# Patient Record
Sex: FEMALE | Race: WHITE | Employment: OTHER | URBAN - METROPOLITAN AREA
[De-identification: names, ages, dates, MRNs, and addresses within clinical notes are randomized per-mention and may not be internally consistent; named-entity substitution may affect disease eponyms.]

---

## 2017-12-15 PROBLEM — I50.9 ACUTE ON CHRONIC CONGESTIVE HEART FAILURE (HCC): Status: ACTIVE | Noted: 2017-12-15

## 2018-04-03 RX ORDER — CARVEDILOL 12.5 MG/1
12.5 TABLET ORAL 2 TIMES DAILY WITH MEALS
Status: ON HOLD | COMMUNITY
End: 2018-07-28 | Stop reason: HOSPADM

## 2018-04-05 ENCOUNTER — ANESTHESIA (OUTPATIENT)
Dept: OPERATING ROOM | Age: 78
End: 2018-04-05
Payer: MEDICARE

## 2018-04-05 ENCOUNTER — HOSPITAL ENCOUNTER (OUTPATIENT)
Dept: GENERAL RADIOLOGY | Age: 78
Discharge: HOME | End: 2018-04-07
Attending: ORTHOPAEDIC SURGERY
Payer: MEDICARE

## 2018-04-05 ENCOUNTER — HOSPITAL ENCOUNTER (OUTPATIENT)
Age: 78
Setting detail: OUTPATIENT SURGERY
Discharge: HOME | End: 2018-04-05
Attending: ORTHOPAEDIC SURGERY | Admitting: ORTHOPAEDIC SURGERY
Payer: MEDICARE

## 2018-04-05 ENCOUNTER — ANESTHESIA EVENT (OUTPATIENT)
Dept: OPERATING ROOM | Age: 78
End: 2018-04-05
Payer: MEDICARE

## 2018-04-05 VITALS
SYSTOLIC BLOOD PRESSURE: 134 MMHG | TEMPERATURE: 95 F | RESPIRATION RATE: 18 BRPM | HEART RATE: 74 BPM | DIASTOLIC BLOOD PRESSURE: 66 MMHG | HEIGHT: 67 IN | WEIGHT: 142 LBS | OXYGEN SATURATION: 100 % | BODY MASS INDEX: 22.29 KG/M2

## 2018-04-05 VITALS — OXYGEN SATURATION: 100 % | TEMPERATURE: 95.7 F | SYSTOLIC BLOOD PRESSURE: 95 MMHG | DIASTOLIC BLOOD PRESSURE: 46 MMHG

## 2018-04-05 DIAGNOSIS — R52 PAIN: ICD-10-CM

## 2018-04-05 DIAGNOSIS — S52.022A: Primary | ICD-10-CM

## 2018-04-05 LAB
ANION GAP SERPL CALCULATED.3IONS-SCNC: 13 MMOL/L (ref 7–16)
BUN BLDV-MCNC: 54 MG/DL (ref 8–23)
CALCIUM SERPL-MCNC: 9 MG/DL (ref 8.6–10.2)
CHLORIDE BLD-SCNC: 99 MMOL/L (ref 98–107)
CO2: 26 MMOL/L (ref 22–29)
CREAT SERPL-MCNC: 1.3 MG/DL (ref 0.5–1)
DIGOXIN LEVEL: 0.6 NG/ML (ref 0.8–2)
GFR AFRICAN AMERICAN: 48
GFR NON-AFRICAN AMERICAN: 40 ML/MIN/1.73
GLUCOSE BLD-MCNC: 73 MG/DL (ref 74–109)
HCT VFR BLD CALC: 34.4 % (ref 34–48)
HEMOGLOBIN: 11.1 G/DL (ref 11.5–15.5)
MCH RBC QN AUTO: 34 PG (ref 26–35)
MCHC RBC AUTO-ENTMCNC: 32.3 % (ref 32–34.5)
MCV RBC AUTO: 105.5 FL (ref 80–99.9)
PDW BLD-RTO: 17.1 FL (ref 11.5–15)
PLATELET # BLD: 234 E9/L (ref 130–450)
PMV BLD AUTO: 9.2 FL (ref 7–12)
POTASSIUM REFLEX MAGNESIUM: 3.7 MMOL/L (ref 3.5–5)
RBC # BLD: 3.26 E12/L (ref 3.5–5.5)
SODIUM BLD-SCNC: 138 MMOL/L (ref 132–146)
WBC # BLD: 5.9 E9/L (ref 4.5–11.5)

## 2018-04-05 PROCEDURE — 7100000010 HC PHASE II RECOVERY - FIRST 15 MIN: Performed by: ORTHOPAEDIC SURGERY

## 2018-04-05 PROCEDURE — 6360000002 HC RX W HCPCS: Performed by: NURSE ANESTHETIST, CERTIFIED REGISTERED

## 2018-04-05 PROCEDURE — 2580000003 HC RX 258: Performed by: ORTHOPAEDIC SURGERY

## 2018-04-05 PROCEDURE — 2500000003 HC RX 250 WO HCPCS: Performed by: NURSE ANESTHETIST, CERTIFIED REGISTERED

## 2018-04-05 PROCEDURE — 7100000011 HC PHASE II RECOVERY - ADDTL 15 MIN: Performed by: ORTHOPAEDIC SURGERY

## 2018-04-05 PROCEDURE — 2500000003 HC RX 250 WO HCPCS: Performed by: ORTHOPAEDIC SURGERY

## 2018-04-05 PROCEDURE — 3600000012 HC SURGERY LEVEL 2 ADDTL 15MIN: Performed by: ORTHOPAEDIC SURGERY

## 2018-04-05 PROCEDURE — 85027 COMPLETE CBC AUTOMATED: CPT

## 2018-04-05 PROCEDURE — 7100000000 HC PACU RECOVERY - FIRST 15 MIN: Performed by: ORTHOPAEDIC SURGERY

## 2018-04-05 PROCEDURE — 76000 FLUOROSCOPY <1 HR PHYS/QHP: CPT

## 2018-04-05 PROCEDURE — 6360000002 HC RX W HCPCS: Performed by: ORTHOPAEDIC SURGERY

## 2018-04-05 PROCEDURE — 2580000003 HC RX 258: Performed by: NURSE ANESTHETIST, CERTIFIED REGISTERED

## 2018-04-05 PROCEDURE — 7100000001 HC PACU RECOVERY - ADDTL 15 MIN: Performed by: ORTHOPAEDIC SURGERY

## 2018-04-05 PROCEDURE — 36415 COLL VENOUS BLD VENIPUNCTURE: CPT

## 2018-04-05 PROCEDURE — 80048 BASIC METABOLIC PNL TOTAL CA: CPT

## 2018-04-05 PROCEDURE — 3700000000 HC ANESTHESIA ATTENDED CARE: Performed by: ORTHOPAEDIC SURGERY

## 2018-04-05 PROCEDURE — 80162 ASSAY OF DIGOXIN TOTAL: CPT

## 2018-04-05 PROCEDURE — 3700000001 HC ADD 15 MINUTES (ANESTHESIA): Performed by: ORTHOPAEDIC SURGERY

## 2018-04-05 PROCEDURE — 3600000002 HC SURGERY LEVEL 2 BASE: Performed by: ORTHOPAEDIC SURGERY

## 2018-04-05 RX ORDER — PROPOFOL 10 MG/ML
INJECTION, EMULSION INTRAVENOUS PRN
Status: DISCONTINUED | OUTPATIENT
Start: 2018-04-05 | End: 2018-04-05 | Stop reason: SDUPTHER

## 2018-04-05 RX ORDER — GLYCOPYRROLATE 0.2 MG/ML
INJECTION INTRAMUSCULAR; INTRAVENOUS PRN
Status: DISCONTINUED | OUTPATIENT
Start: 2018-04-05 | End: 2018-04-05 | Stop reason: SDUPTHER

## 2018-04-05 RX ORDER — SODIUM CHLORIDE 0.9 % (FLUSH) 0.9 %
10 SYRINGE (ML) INJECTION EVERY 12 HOURS SCHEDULED
Status: DISCONTINUED | OUTPATIENT
Start: 2018-04-05 | End: 2018-04-05 | Stop reason: HOSPADM

## 2018-04-05 RX ORDER — ROCURONIUM BROMIDE 10 MG/ML
INJECTION, SOLUTION INTRAVENOUS PRN
Status: DISCONTINUED | OUTPATIENT
Start: 2018-04-05 | End: 2018-04-05 | Stop reason: SDUPTHER

## 2018-04-05 RX ORDER — FENTANYL CITRATE 50 UG/ML
INJECTION, SOLUTION INTRAMUSCULAR; INTRAVENOUS PRN
Status: DISCONTINUED | OUTPATIENT
Start: 2018-04-05 | End: 2018-04-05 | Stop reason: SDUPTHER

## 2018-04-05 RX ORDER — HYDROCODONE BITARTRATE AND ACETAMINOPHEN 5; 325 MG/1; MG/1
2 TABLET ORAL EVERY 4 HOURS PRN
Status: DISCONTINUED | OUTPATIENT
Start: 2018-04-05 | End: 2018-04-05 | Stop reason: HOSPADM

## 2018-04-05 RX ORDER — DEXAMETHASONE SODIUM PHOSPHATE 4 MG/ML
INJECTION, SOLUTION INTRA-ARTICULAR; INTRALESIONAL; INTRAMUSCULAR; INTRAVENOUS; SOFT TISSUE PRN
Status: DISCONTINUED | OUTPATIENT
Start: 2018-04-05 | End: 2018-04-05 | Stop reason: SDUPTHER

## 2018-04-05 RX ORDER — FENTANYL CITRATE 50 UG/ML
25 INJECTION, SOLUTION INTRAMUSCULAR; INTRAVENOUS EVERY 5 MIN PRN
Status: DISCONTINUED | OUTPATIENT
Start: 2018-04-05 | End: 2018-04-05 | Stop reason: HOSPADM

## 2018-04-05 RX ORDER — MORPHINE SULFATE 2 MG/ML
2 INJECTION, SOLUTION INTRAMUSCULAR; INTRAVENOUS EVERY 5 MIN PRN
Status: DISCONTINUED | OUTPATIENT
Start: 2018-04-05 | End: 2018-04-05 | Stop reason: HOSPADM

## 2018-04-05 RX ORDER — SODIUM CHLORIDE, SODIUM LACTATE, POTASSIUM CHLORIDE, CALCIUM CHLORIDE 600; 310; 30; 20 MG/100ML; MG/100ML; MG/100ML; MG/100ML
INJECTION, SOLUTION INTRAVENOUS CONTINUOUS PRN
Status: DISCONTINUED | OUTPATIENT
Start: 2018-04-05 | End: 2018-04-05 | Stop reason: SDUPTHER

## 2018-04-05 RX ORDER — HYDROMORPHONE HYDROCHLORIDE 2 MG/1
2 TABLET ORAL
Status: DISCONTINUED | OUTPATIENT
Start: 2018-04-05 | End: 2018-04-05 | Stop reason: HOSPADM

## 2018-04-05 RX ORDER — BUPIVACAINE HYDROCHLORIDE AND EPINEPHRINE 5; 5 MG/ML; UG/ML
INJECTION, SOLUTION EPIDURAL; INTRACAUDAL; PERINEURAL PRN
Status: DISCONTINUED | OUTPATIENT
Start: 2018-04-05 | End: 2018-04-05 | Stop reason: HOSPADM

## 2018-04-05 RX ORDER — HYDROMORPHONE HYDROCHLORIDE 2 MG/1
1 TABLET ORAL
Status: DISCONTINUED | OUTPATIENT
Start: 2018-04-05 | End: 2018-04-05 | Stop reason: HOSPADM

## 2018-04-05 RX ORDER — SODIUM CHLORIDE 0.9 % (FLUSH) 0.9 %
10 SYRINGE (ML) INJECTION PRN
Status: DISCONTINUED | OUTPATIENT
Start: 2018-04-05 | End: 2018-04-05 | Stop reason: HOSPADM

## 2018-04-05 RX ORDER — HYDROCODONE BITARTRATE AND ACETAMINOPHEN 5; 325 MG/1; MG/1
1 TABLET ORAL EVERY 4 HOURS PRN
Qty: 40 TABLET | Refills: 0 | Status: SHIPPED | OUTPATIENT
Start: 2018-04-05 | End: 2018-04-12

## 2018-04-05 RX ORDER — ONDANSETRON 2 MG/ML
INJECTION INTRAMUSCULAR; INTRAVENOUS PRN
Status: DISCONTINUED | OUTPATIENT
Start: 2018-04-05 | End: 2018-04-05 | Stop reason: SDUPTHER

## 2018-04-05 RX ORDER — SODIUM CHLORIDE 9 MG/ML
INJECTION, SOLUTION INTRAVENOUS CONTINUOUS
Status: DISCONTINUED | OUTPATIENT
Start: 2018-04-05 | End: 2018-04-05 | Stop reason: HOSPADM

## 2018-04-05 RX ORDER — ACETAMINOPHEN 325 MG/1
650 TABLET ORAL EVERY 4 HOURS PRN
Status: DISCONTINUED | OUTPATIENT
Start: 2018-04-05 | End: 2018-04-05 | Stop reason: HOSPADM

## 2018-04-05 RX ORDER — MIDAZOLAM HYDROCHLORIDE 1 MG/ML
INJECTION INTRAMUSCULAR; INTRAVENOUS PRN
Status: DISCONTINUED | OUTPATIENT
Start: 2018-04-05 | End: 2018-04-05 | Stop reason: SDUPTHER

## 2018-04-05 RX ORDER — LIDOCAINE HYDROCHLORIDE 20 MG/ML
INJECTION, SOLUTION EPIDURAL; INFILTRATION; INTRACAUDAL; PERINEURAL PRN
Status: DISCONTINUED | OUTPATIENT
Start: 2018-04-05 | End: 2018-04-05 | Stop reason: SDUPTHER

## 2018-04-05 RX ORDER — HYDROCODONE BITARTRATE AND ACETAMINOPHEN 5; 325 MG/1; MG/1
1 TABLET ORAL EVERY 4 HOURS PRN
Status: DISCONTINUED | OUTPATIENT
Start: 2018-04-05 | End: 2018-04-05 | Stop reason: HOSPADM

## 2018-04-05 RX ORDER — HYDROCODONE BITARTRATE AND ACETAMINOPHEN 5; 325 MG/1; MG/1
2 TABLET ORAL PRN
Status: DISCONTINUED | OUTPATIENT
Start: 2018-04-05 | End: 2018-04-05 | Stop reason: HOSPADM

## 2018-04-05 RX ORDER — HYDROCODONE BITARTRATE AND ACETAMINOPHEN 5; 325 MG/1; MG/1
1 TABLET ORAL PRN
Status: DISCONTINUED | OUTPATIENT
Start: 2018-04-05 | End: 2018-04-05 | Stop reason: HOSPADM

## 2018-04-05 RX ORDER — ONDANSETRON 2 MG/ML
4 INJECTION INTRAMUSCULAR; INTRAVENOUS EVERY 6 HOURS PRN
Status: DISCONTINUED | OUTPATIENT
Start: 2018-04-05 | End: 2018-04-05 | Stop reason: HOSPADM

## 2018-04-05 RX ORDER — CEPHALEXIN 500 MG/1
500 CAPSULE ORAL 3 TIMES DAILY
Qty: 6 CAPSULE | Refills: 0 | Status: ON HOLD | OUTPATIENT
Start: 2018-04-05 | End: 2018-07-24 | Stop reason: HOSPADM

## 2018-04-05 RX ORDER — NEOSTIGMINE METHYLSULFATE 1 MG/ML
INJECTION, SOLUTION INTRAVENOUS PRN
Status: DISCONTINUED | OUTPATIENT
Start: 2018-04-05 | End: 2018-04-05 | Stop reason: SDUPTHER

## 2018-04-05 RX ADMIN — CEFAZOLIN SODIUM 2 G: 2 SOLUTION INTRAVENOUS at 17:56

## 2018-04-05 RX ADMIN — FENTANYL CITRATE 50 MCG: 50 INJECTION, SOLUTION INTRAMUSCULAR; INTRAVENOUS at 18:01

## 2018-04-05 RX ADMIN — MIDAZOLAM HYDROCHLORIDE 2 MG: 1 INJECTION, SOLUTION INTRAMUSCULAR; INTRAVENOUS at 17:54

## 2018-04-05 RX ADMIN — PHENYLEPHRINE HYDROCHLORIDE 100 MCG: 10 INJECTION INTRAVENOUS at 18:19

## 2018-04-05 RX ADMIN — DEXAMETHASONE SODIUM PHOSPHATE 8 MG: 4 INJECTION, SOLUTION INTRA-ARTICULAR; INTRALESIONAL; INTRAMUSCULAR; INTRAVENOUS; SOFT TISSUE at 18:41

## 2018-04-05 RX ADMIN — ONDANSETRON 4 MG: 2 INJECTION, SOLUTION INTRAMUSCULAR; INTRAVENOUS at 18:41

## 2018-04-05 RX ADMIN — SODIUM CHLORIDE: 9 INJECTION, SOLUTION INTRAVENOUS at 17:54

## 2018-04-05 RX ADMIN — PROPOFOL 70 MG: 10 INJECTION, EMULSION INTRAVENOUS at 18:01

## 2018-04-05 RX ADMIN — LIDOCAINE HYDROCHLORIDE 80 MG: 20 INJECTION, SOLUTION EPIDURAL; INFILTRATION; INTRACAUDAL; PERINEURAL at 18:01

## 2018-04-05 RX ADMIN — PHENYLEPHRINE HYDROCHLORIDE 100 MCG: 10 INJECTION INTRAVENOUS at 18:34

## 2018-04-05 RX ADMIN — Medication 0.4 MG: at 19:35

## 2018-04-05 RX ADMIN — Medication 3 MG: at 19:35

## 2018-04-05 RX ADMIN — SODIUM CHLORIDE, POTASSIUM CHLORIDE, SODIUM LACTATE AND CALCIUM CHLORIDE: 600; 310; 30; 20 INJECTION, SOLUTION INTRAVENOUS at 18:44

## 2018-04-05 RX ADMIN — ROCURONIUM BROMIDE 30 MG: 10 SOLUTION INTRAVENOUS at 18:01

## 2018-04-05 ASSESSMENT — PULMONARY FUNCTION TESTS
PIF_VALUE: 18
PIF_VALUE: 4
PIF_VALUE: 15
PIF_VALUE: 19
PIF_VALUE: 18
PIF_VALUE: 26
PIF_VALUE: 18
PIF_VALUE: 18
PIF_VALUE: 0
PIF_VALUE: 19
PIF_VALUE: 18
PIF_VALUE: 17
PIF_VALUE: 19
PIF_VALUE: 18
PIF_VALUE: 19
PIF_VALUE: 0
PIF_VALUE: 18
PIF_VALUE: 16
PIF_VALUE: 18
PIF_VALUE: 20
PIF_VALUE: 16
PIF_VALUE: 18
PIF_VALUE: 17
PIF_VALUE: 3
PIF_VALUE: 17
PIF_VALUE: 17
PIF_VALUE: 19
PIF_VALUE: 17
PIF_VALUE: 1
PIF_VALUE: 18
PIF_VALUE: 19
PIF_VALUE: 18
PIF_VALUE: 2
PIF_VALUE: 18
PIF_VALUE: 18
PIF_VALUE: 2
PIF_VALUE: 19
PIF_VALUE: 18
PIF_VALUE: 20
PIF_VALUE: 19
PIF_VALUE: 19
PIF_VALUE: 1
PIF_VALUE: 19
PIF_VALUE: 18
PIF_VALUE: 18
PIF_VALUE: 5
PIF_VALUE: 18
PIF_VALUE: 20
PIF_VALUE: 18
PIF_VALUE: 1
PIF_VALUE: 18
PIF_VALUE: 19
PIF_VALUE: 11
PIF_VALUE: 1
PIF_VALUE: 16
PIF_VALUE: 18
PIF_VALUE: 18
PIF_VALUE: 19
PIF_VALUE: 18
PIF_VALUE: 18
PIF_VALUE: 20
PIF_VALUE: 17
PIF_VALUE: 20
PIF_VALUE: 18
PIF_VALUE: 18
PIF_VALUE: 19
PIF_VALUE: 18
PIF_VALUE: 17
PIF_VALUE: 18
PIF_VALUE: 2
PIF_VALUE: 15
PIF_VALUE: 17
PIF_VALUE: 2
PIF_VALUE: 19
PIF_VALUE: 18
PIF_VALUE: 18
PIF_VALUE: 1
PIF_VALUE: 18
PIF_VALUE: 18
PIF_VALUE: 20
PIF_VALUE: 18
PIF_VALUE: 15
PIF_VALUE: 19
PIF_VALUE: 17
PIF_VALUE: 19
PIF_VALUE: 18
PIF_VALUE: 18
PIF_VALUE: 2
PIF_VALUE: 18
PIF_VALUE: 15
PIF_VALUE: 15
PIF_VALUE: 18
PIF_VALUE: 15
PIF_VALUE: 14
PIF_VALUE: 4
PIF_VALUE: 19

## 2018-04-05 ASSESSMENT — LIFESTYLE VARIABLES: SMOKING_STATUS: 0

## 2018-04-05 ASSESSMENT — PAIN SCALES - GENERAL
PAINLEVEL_OUTOF10: 1

## 2018-04-05 ASSESSMENT — PAIN DESCRIPTION - PAIN TYPE
TYPE: SURGICAL PAIN

## 2018-04-05 ASSESSMENT — PAIN DESCRIPTION - DESCRIPTORS
DESCRIPTORS: ACHING

## 2018-04-05 ASSESSMENT — PAIN DESCRIPTION - ORIENTATION
ORIENTATION: LEFT

## 2018-04-05 ASSESSMENT — PAIN DESCRIPTION - LOCATION
LOCATION: ARM

## 2018-04-05 ASSESSMENT — PAIN - FUNCTIONAL ASSESSMENT: PAIN_FUNCTIONAL_ASSESSMENT: 0-10

## 2018-04-05 ASSESSMENT — PAIN DESCRIPTION - ONSET: ONSET: ON-GOING

## 2018-05-10 ENCOUNTER — APPOINTMENT (OUTPATIENT)
Dept: GENERAL RADIOLOGY | Age: 78
End: 2018-05-10
Payer: MEDICARE

## 2018-05-10 ENCOUNTER — HOSPITAL ENCOUNTER (EMERGENCY)
Age: 78
Discharge: HOME | End: 2018-05-10
Attending: EMERGENCY MEDICINE
Payer: MEDICARE

## 2018-05-10 VITALS
BODY MASS INDEX: 23.05 KG/M2 | DIASTOLIC BLOOD PRESSURE: 59 MMHG | OXYGEN SATURATION: 98 % | HEIGHT: 64 IN | RESPIRATION RATE: 18 BRPM | SYSTOLIC BLOOD PRESSURE: 102 MMHG | HEART RATE: 75 BPM | WEIGHT: 135 LBS | TEMPERATURE: 98 F

## 2018-05-10 DIAGNOSIS — I50.9 ACUTE ON CHRONIC CONGESTIVE HEART FAILURE, UNSPECIFIED CONGESTIVE HEART FAILURE TYPE: Primary | ICD-10-CM

## 2018-05-10 LAB
ANION GAP SERPL CALCULATED.3IONS-SCNC: 14 MMOL/L (ref 7–16)
BASOPHILS ABSOLUTE: 0.05 E9/L (ref 0–0.2)
BASOPHILS RELATIVE PERCENT: 1.1 % (ref 0–2)
BUN BLDV-MCNC: 58 MG/DL (ref 8–23)
CALCIUM SERPL-MCNC: 8.7 MG/DL (ref 8.6–10.2)
CHLORIDE BLD-SCNC: 99 MMOL/L (ref 98–107)
CO2: 23 MMOL/L (ref 22–29)
CREAT SERPL-MCNC: 1.3 MG/DL (ref 0.5–1)
EKG ATRIAL RATE: 69 BPM
EKG Q-T INTERVAL: 472 MS
EKG QRS DURATION: 208 MS
EKG QTC CALCULATION (BAZETT): 527 MS
EKG R AXIS: -58 DEGREES
EKG T AXIS: 101 DEGREES
EKG VENTRICULAR RATE: 75 BPM
EOSINOPHILS ABSOLUTE: 0.06 E9/L (ref 0.05–0.5)
EOSINOPHILS RELATIVE PERCENT: 1.3 % (ref 0–6)
GFR AFRICAN AMERICAN: 48
GFR NON-AFRICAN AMERICAN: 40 ML/MIN/1.73
GLUCOSE BLD-MCNC: 123 MG/DL (ref 74–109)
HCT VFR BLD CALC: 32.4 % (ref 34–48)
HEMOGLOBIN: 10.7 G/DL (ref 11.5–15.5)
IMMATURE GRANULOCYTES #: 0.02 E9/L
IMMATURE GRANULOCYTES %: 0.4 % (ref 0–5)
LYMPHOCYTES ABSOLUTE: 1.26 E9/L (ref 1.5–4)
LYMPHOCYTES RELATIVE PERCENT: 28.3 % (ref 20–42)
MCH RBC QN AUTO: 34.5 PG (ref 26–35)
MCHC RBC AUTO-ENTMCNC: 33 % (ref 32–34.5)
MCV RBC AUTO: 104.5 FL (ref 80–99.9)
MONOCYTES ABSOLUTE: 0.5 E9/L (ref 0.1–0.95)
MONOCYTES RELATIVE PERCENT: 11.2 % (ref 2–12)
NEUTROPHILS ABSOLUTE: 2.57 E9/L (ref 1.8–7.3)
NEUTROPHILS RELATIVE PERCENT: 57.7 % (ref 43–80)
PDW BLD-RTO: 16.5 FL (ref 11.5–15)
PLATELET # BLD: 143 E9/L (ref 130–450)
PMV BLD AUTO: 10.1 FL (ref 7–12)
POTASSIUM SERPL-SCNC: 5.5 MMOL/L (ref 3.5–5)
PRO-BNP: 7319 PG/ML (ref 0–450)
RBC # BLD: 3.1 E12/L (ref 3.5–5.5)
SODIUM BLD-SCNC: 136 MMOL/L (ref 132–146)
TROPONIN: 0.05 NG/ML (ref 0–0.03)
WBC # BLD: 4.5 E9/L (ref 4.5–11.5)

## 2018-05-10 PROCEDURE — 94664 DEMO&/EVAL PT USE INHALER: CPT

## 2018-05-10 PROCEDURE — 6370000000 HC RX 637 (ALT 250 FOR IP)

## 2018-05-10 PROCEDURE — 6370000000 HC RX 637 (ALT 250 FOR IP): Performed by: EMERGENCY MEDICINE

## 2018-05-10 PROCEDURE — 99285 EMERGENCY DEPT VISIT HI MDM: CPT

## 2018-05-10 PROCEDURE — 84484 ASSAY OF TROPONIN QUANT: CPT

## 2018-05-10 PROCEDURE — 83880 ASSAY OF NATRIURETIC PEPTIDE: CPT

## 2018-05-10 PROCEDURE — 6360000002 HC RX W HCPCS: Performed by: EMERGENCY MEDICINE

## 2018-05-10 PROCEDURE — 36415 COLL VENOUS BLD VENIPUNCTURE: CPT

## 2018-05-10 PROCEDURE — 71045 X-RAY EXAM CHEST 1 VIEW: CPT

## 2018-05-10 PROCEDURE — 80048 BASIC METABOLIC PNL TOTAL CA: CPT

## 2018-05-10 PROCEDURE — 85025 COMPLETE CBC W/AUTO DIFF WBC: CPT

## 2018-05-10 RX ORDER — IPRATROPIUM BROMIDE AND ALBUTEROL SULFATE 2.5; .5 MG/3ML; MG/3ML
1 SOLUTION RESPIRATORY (INHALATION) ONCE
Status: COMPLETED | OUTPATIENT
Start: 2018-05-10 | End: 2018-05-10

## 2018-05-10 RX ORDER — FUROSEMIDE 40 MG/1
20 TABLET ORAL ONCE
Status: COMPLETED | OUTPATIENT
Start: 2018-05-10 | End: 2018-05-10

## 2018-05-10 RX ORDER — FUROSEMIDE 10 MG/ML
20 INJECTION INTRAMUSCULAR; INTRAVENOUS ONCE
Status: DISCONTINUED | OUTPATIENT
Start: 2018-05-10 | End: 2018-05-11 | Stop reason: HOSPADM

## 2018-05-10 RX ORDER — FUROSEMIDE 40 MG/1
TABLET ORAL
Status: COMPLETED
Start: 2018-05-10 | End: 2018-05-10

## 2018-05-10 RX ADMIN — IPRATROPIUM BROMIDE AND ALBUTEROL SULFATE 1 AMPULE: .5; 3 SOLUTION RESPIRATORY (INHALATION) at 21:55

## 2018-05-10 RX ADMIN — FUROSEMIDE 20 MG: 40 TABLET ORAL at 21:41

## 2018-05-10 ASSESSMENT — ENCOUNTER SYMPTOMS
ABDOMINAL PAIN: 0
VOMITING: 0
COUGH: 0
NAUSEA: 0
BACK PAIN: 0
BLOOD IN STOOL: 0
SHORTNESS OF BREATH: 1

## 2018-07-19 ENCOUNTER — APPOINTMENT (OUTPATIENT)
Dept: GENERAL RADIOLOGY | Age: 78
DRG: 291 | End: 2018-07-19
Payer: MEDICARE

## 2018-07-19 ENCOUNTER — HOSPITAL ENCOUNTER (INPATIENT)
Age: 78
LOS: 4 days | Discharge: HOME | DRG: 291 | End: 2018-07-24
Attending: EMERGENCY MEDICINE | Admitting: INTERNAL MEDICINE
Payer: MEDICARE

## 2018-07-19 DIAGNOSIS — M79.89 LEG SWELLING: ICD-10-CM

## 2018-07-19 DIAGNOSIS — N17.9 AKI (ACUTE KIDNEY INJURY) (HCC): ICD-10-CM

## 2018-07-19 DIAGNOSIS — I50.9 ACUTE ON CHRONIC CONGESTIVE HEART FAILURE, UNSPECIFIED CONGESTIVE HEART FAILURE TYPE: Primary | ICD-10-CM

## 2018-07-19 LAB
ALBUMIN SERPL-MCNC: 4.1 G/DL (ref 3.5–5.2)
ALP BLD-CCNC: 175 U/L (ref 35–104)
ALT SERPL-CCNC: 22 U/L (ref 0–32)
ANION GAP SERPL CALCULATED.3IONS-SCNC: 18 MMOL/L (ref 7–16)
AST SERPL-CCNC: 38 U/L (ref 0–31)
BASOPHILS ABSOLUTE: 0.04 E9/L (ref 0–0.2)
BASOPHILS RELATIVE PERCENT: 0.6 % (ref 0–2)
BILIRUB SERPL-MCNC: 1.3 MG/DL (ref 0–1.2)
BUN BLDV-MCNC: 100 MG/DL (ref 8–23)
CALCIUM SERPL-MCNC: 8.7 MG/DL (ref 8.6–10.2)
CHLORIDE BLD-SCNC: 96 MMOL/L (ref 98–107)
CO2: 22 MMOL/L (ref 22–29)
CREAT SERPL-MCNC: 1.9 MG/DL (ref 0.5–1)
EKG ATRIAL RATE: 46 BPM
EKG Q-T INTERVAL: 482 MS
EKG QRS DURATION: 180 MS
EKG QTC CALCULATION (BAZETT): 538 MS
EKG R AXIS: -54 DEGREES
EKG T AXIS: 105 DEGREES
EKG VENTRICULAR RATE: 75 BPM
EOSINOPHILS ABSOLUTE: 0.1 E9/L (ref 0.05–0.5)
EOSINOPHILS RELATIVE PERCENT: 1.5 % (ref 0–6)
GFR AFRICAN AMERICAN: 31
GFR NON-AFRICAN AMERICAN: 26 ML/MIN/1.73
GLUCOSE BLD-MCNC: 127 MG/DL (ref 74–109)
HCT VFR BLD CALC: 32.8 % (ref 34–48)
HEMOGLOBIN: 10.9 G/DL (ref 11.5–15.5)
IMMATURE GRANULOCYTES #: 0.04 E9/L
IMMATURE GRANULOCYTES %: 0.6 % (ref 0–5)
LYMPHOCYTES ABSOLUTE: 1.16 E9/L (ref 1.5–4)
LYMPHOCYTES RELATIVE PERCENT: 17.3 % (ref 20–42)
MCH RBC QN AUTO: 35.6 PG (ref 26–35)
MCHC RBC AUTO-ENTMCNC: 33.2 % (ref 32–34.5)
MCV RBC AUTO: 107.2 FL (ref 80–99.9)
MONOCYTES ABSOLUTE: 0.8 E9/L (ref 0.1–0.95)
MONOCYTES RELATIVE PERCENT: 11.9 % (ref 2–12)
NEUTROPHILS ABSOLUTE: 4.58 E9/L (ref 1.8–7.3)
NEUTROPHILS RELATIVE PERCENT: 68.1 % (ref 43–80)
PDW BLD-RTO: 18 FL (ref 11.5–15)
PLATELET # BLD: 171 E9/L (ref 130–450)
PMV BLD AUTO: 10.5 FL (ref 7–12)
POTASSIUM SERPL-SCNC: 4.5 MMOL/L (ref 3.5–5)
PRO-BNP: ABNORMAL PG/ML (ref 0–450)
RBC # BLD: 3.06 E12/L (ref 3.5–5.5)
SODIUM BLD-SCNC: 136 MMOL/L (ref 132–146)
TOTAL PROTEIN: 7 G/DL (ref 6.4–8.3)
TROPONIN: 0.12 NG/ML (ref 0–0.03)
WBC # BLD: 6.7 E9/L (ref 4.5–11.5)

## 2018-07-19 PROCEDURE — 71045 X-RAY EXAM CHEST 1 VIEW: CPT

## 2018-07-19 PROCEDURE — 83880 ASSAY OF NATRIURETIC PEPTIDE: CPT

## 2018-07-19 PROCEDURE — 85025 COMPLETE CBC W/AUTO DIFF WBC: CPT

## 2018-07-19 PROCEDURE — 93005 ELECTROCARDIOGRAM TRACING: CPT | Performed by: EMERGENCY MEDICINE

## 2018-07-19 PROCEDURE — 80053 COMPREHEN METABOLIC PANEL: CPT

## 2018-07-19 PROCEDURE — 99285 EMERGENCY DEPT VISIT HI MDM: CPT

## 2018-07-19 PROCEDURE — 84484 ASSAY OF TROPONIN QUANT: CPT

## 2018-07-19 PROCEDURE — 80162 ASSAY OF DIGOXIN TOTAL: CPT

## 2018-07-20 PROBLEM — I50.9 CONGESTIVE HEART FAILURE WITH UNKNOWN LEFT VENTRICULAR EJECTION FRACTION (HCC): Status: ACTIVE | Noted: 2018-07-20
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
## 2018-07-20 PROBLEM — E11.9 TYPE 2 DIABETES MELLITUS WITHOUT COMPLICATION (HCC): Status: ACTIVE | Noted: 2018-07-20

## 2018-07-20 PROBLEM — I05.9 MITRAL VALVE DISORDER: Status: ACTIVE | Noted: 2018-07-20

## 2018-07-20 LAB — DIGOXIN LEVEL: 1.6 NG/ML (ref 0.8–2)

## 2018-07-20 PROCEDURE — G8978 MOBILITY CURRENT STATUS: HCPCS

## 2018-07-20 PROCEDURE — 6370000000 HC RX 637 (ALT 250 FOR IP): Performed by: INTERNAL MEDICINE

## 2018-07-20 PROCEDURE — 97165 OT EVAL LOW COMPLEX 30 MIN: CPT

## 2018-07-20 PROCEDURE — 6360000002 HC RX W HCPCS: Performed by: EMERGENCY MEDICINE

## 2018-07-20 PROCEDURE — 6360000002 HC RX W HCPCS: Performed by: INTERNAL MEDICINE

## 2018-07-20 PROCEDURE — 97530 THERAPEUTIC ACTIVITIES: CPT

## 2018-07-20 PROCEDURE — 2580000003 HC RX 258: Performed by: INTERNAL MEDICINE

## 2018-07-20 PROCEDURE — G8987 SELF CARE CURRENT STATUS: HCPCS

## 2018-07-20 PROCEDURE — 2060000000 HC ICU INTERMEDIATE R&B

## 2018-07-20 PROCEDURE — 97161 PT EVAL LOW COMPLEX 20 MIN: CPT

## 2018-07-20 PROCEDURE — G8988 SELF CARE GOAL STATUS: HCPCS

## 2018-07-20 PROCEDURE — G8979 MOBILITY GOAL STATUS: HCPCS

## 2018-07-20 RX ORDER — SODIUM CHLORIDE 0.9 % (FLUSH) 0.9 %
10 SYRINGE (ML) INJECTION PRN
Status: DISCONTINUED | OUTPATIENT
Start: 2018-07-20 | End: 2018-07-24 | Stop reason: HOSPADM

## 2018-07-20 RX ORDER — CARVEDILOL 6.25 MG/1
6.25 TABLET ORAL 2 TIMES DAILY WITH MEALS
Status: DISCONTINUED | OUTPATIENT
Start: 2018-07-20 | End: 2018-07-24 | Stop reason: HOSPADM

## 2018-07-20 RX ORDER — FUROSEMIDE 10 MG/ML
40 INJECTION INTRAMUSCULAR; INTRAVENOUS ONCE
Status: COMPLETED | OUTPATIENT
Start: 2018-07-20 | End: 2018-07-20

## 2018-07-20 RX ORDER — DIAZEPAM 5 MG/1
5 TABLET ORAL NIGHTLY PRN
Status: ON HOLD | COMMUNITY
End: 2018-07-24 | Stop reason: HOSPADM

## 2018-07-20 RX ORDER — ACETAMINOPHEN 325 MG/1
650 TABLET ORAL EVERY 4 HOURS PRN
Status: DISCONTINUED | OUTPATIENT
Start: 2018-07-20 | End: 2018-07-24 | Stop reason: HOSPADM

## 2018-07-20 RX ORDER — DIGOXIN 125 MCG
125 TABLET ORAL DAILY
Status: DISCONTINUED | OUTPATIENT
Start: 2018-07-20 | End: 2018-07-24 | Stop reason: HOSPADM

## 2018-07-20 RX ORDER — SODIUM CHLORIDE 0.9 % (FLUSH) 0.9 %
10 SYRINGE (ML) INJECTION EVERY 12 HOURS SCHEDULED
Status: DISCONTINUED | OUTPATIENT
Start: 2018-07-20 | End: 2018-07-24 | Stop reason: HOSPADM

## 2018-07-20 RX ORDER — FUROSEMIDE 10 MG/ML
40 INJECTION INTRAMUSCULAR; INTRAVENOUS 2 TIMES DAILY
Status: COMPLETED | OUTPATIENT
Start: 2018-07-20 | End: 2018-07-21

## 2018-07-20 RX ADMIN — Medication 10 ML: at 08:31

## 2018-07-20 RX ADMIN — ENOXAPARIN SODIUM 30 MG: 30 INJECTION SUBCUTANEOUS at 08:31

## 2018-07-20 RX ADMIN — FUROSEMIDE 40 MG: 10 INJECTION, SOLUTION INTRAMUSCULAR; INTRAVENOUS at 14:19

## 2018-07-20 RX ADMIN — DIGOXIN 125 MCG: 125 TABLET ORAL at 14:19

## 2018-07-20 RX ADMIN — FUROSEMIDE 40 MG: 10 INJECTION, SOLUTION INTRAMUSCULAR; INTRAVENOUS at 00:35

## 2018-07-20 RX ADMIN — CARVEDILOL 6.25 MG: 6.25 TABLET, FILM COATED ORAL at 16:51

## 2018-07-20 RX ADMIN — FUROSEMIDE 40 MG: 10 INJECTION, SOLUTION INTRAMUSCULAR; INTRAVENOUS at 19:47

## 2018-07-20 ASSESSMENT — PAIN SCALES - GENERAL
PAINLEVEL_OUTOF10: 0

## 2018-07-20 ASSESSMENT — ENCOUNTER SYMPTOMS: SHORTNESS OF BREATH: 1

## 2018-07-20 NOTE — CONSULTS
[facility] [address] [place] [address]    Name: Smiley Gunderson    Age: 70 y.o. Date of Admission: 7/19/2018 10:16 PM    Date of Service: 7/20/2018    Reason for Consultation: CHF    Referring Physician: Dr Cindy Melgar  Primary Care Physician: Perry Sidhu MD    History of Present Illness: The patient is a 70y.o. year old female coming into [facility] for increase MCKEON and weakness, says she can't hold herself up. States she has fallen \"50 times \" in the last couple months. Has had increased edema, dyspnea getting in and out of bed, but denies orthopnea, PND, chest pain or palpitations. States checks her pulse ox, always in the 90's. History of persistent atrial fibrillation, cardiomyopathy with chronic systolic heart failure, biventricular ICD followed by Dr. Jimbo Acosta, most recently replaced September 2014, originally placed 2005. Echo 12/2017 EF estimated at 25%, DEBBY, RVE,moderate to severe MR, moderate TR  RVSP50 mmHg. Past Medical History:   Past Medical History:   Diagnosis Date    Atrial fibrillation ([address]     pacer/aicd    Bronchitis     multiple times,  12/2013, 01/2014. - no issues currently     CAD (coronary artery disease)     CHF (congestive heart failure) ([address]     2000    Fall     fractured L elbow- for OR 4-5-18     Glaucoma of both eyes     Gout     Hyperlipidemia     Hypertension     pt states controlled    Laceration of left wrist     splint to L arm     MS (multiple sclerosis) ([address]     past 30 years; denies recent problems    Neuropathy of both feet     Skin cancer of forehead        Review of Systems:   Constitutional: No fever, chills, sweats  Cardiac: As per HPI  Pulmonary: No cough, wheeze, hemoptysis  HEENT: No visual disturbances, difficult swallowing  GI: No nausea, vomiting, diarrhea, abdominal pain, rectal bleeding  : No dysuria or hematuria  Endocrine: No excessive thirst, heat or cold intolerance.    Musculoskeletal:muscle weakness  Skin: bruises  Neuro: No headache, confusion, or seizures  Psych: No depression, anxiety    Family History:  History reviewed. No pertinent family history. Social History:  Social History     Social History    Marital status:      Spouse name: N/A    Number of children: N/A    Years of education: N/A     Occupational History    Not on file. Social History Main Topics    Smoking status: Never Smoker    Smokeless tobacco: Never Used    Alcohol use Yes      Comment: social    Drug use: No    Sexual activity: Not on file     Other Topics Concern    Not on file     Social History Narrative    No narrative on file       Allergies: Allergies   Allergen Reactions    Nsaids Other (See Comments)       Home Medications:  Prior to Admission medications    Medication Sig Start Date End Date Taking? Authorizing Provider   diazepam (VALIUM) 5 MG tablet Take 5 mg by mouth nightly as needed for Anxiety. .   Yes Historical Provider, MD   carvedilol (COREG) 12.5 MG tablet Take 12.5 mg by mouth 2 times daily (with meals) Instructed to take am of procedure   Yes Historical Provider, MD   digoxin (LANOXIN) 0.25 MG tablet Take 125 mcg by mouth nightly    Yes Historical Provider, MD   torsemide (DEMADEX) 20 MG tablet Take 20 mg by mouth 2 times daily. Yes Historical Provider, MD   valsartan (DIOVAN) 80 MG tablet Take 80 mg by mouth 2 times daily    Yes Historical Provider, MD   Coenzyme Q10-Fish Oil-Vit E (CO-Q 10 OMEGA-3 FISH OIL PO) Take by mouth Last dose 4/3/18. Yes Historical Provider, MD   NATTOKINASE PO Take by mouth natto plus. Contact Dr Omi Hi re:preop instructions   Yes Historical Provider, MD   Multiple Vitamins-Minerals (DAILY HEART HEALTH SUPPORT PO) Take by mouth Multivitamin  LD 4-3-18   Yes Historical Provider, MD   allopurinol (ZYLOPRIM) 100 MG tablet Take 200 mg by mouth nightly.    Yes Historical Provider, MD   Travoprost, BAK Free, (TRAVATAN Z) 0.004 % SOLN ophthalmic solution Place 1 drop hours. No results found for: TSH    ABGs:  No results for input(s): PH, PO2, PCO2, HCO3, BE, O2SAT in the last 72 hours. Lactic Acid:  No results for input(s): LACTA in the last 72 hours. Radiology:  RAD Results:  XR CHEST PORTABLE   Final Result   1. Similar findings as observed on the study of May 10.   2. Findings compatible with chronic congestive heart failure. EKG and Telemetry:  12-lead EKG personally reviewed and shows V pacing 75     Telemetry personally reviewed and shows paced rhythm, afib        ASSESSMENT / PLAN:    1. 1.         Acute on chronic systolic heart failure with chronically elevated BNP. Denies anginal symptoms. No acute changes noted on EKG, but is a paced rhythm. Try to diurese, YENNY hose. She doesn't want any aggressive measures so don't see any indication to cath or stress at this time. will resume digoxin 125, coreg>6.25bid, if Renal function stable restart diovan or contsider entresto  2.         Biventricular ICD followed by electrophysiology replaced September 2014  3.         Atrial fibrillation chronic refuses any anticoagulation other than aspirin secondary to risk of falls  4          multiple sclerosis and possible seizure issues on antiseizure medications. Thank you for consultation.     Lia Steen MD, [facility]  The [address] at [facility]    Electronically signed by Lia Steen MD on 7/20/2018 at 11:44 AM

## 2018-07-20 NOTE — CARE COORDINATION
Social Work / Discharge Planning : S and CM attempted to meet with patient but patient using bathroom. Will re-approach. Still awaiting therapy input to better assist with discharge planning. Patient currently using 1 liter of 02 and this is new. Hopefully 02 will not be needed at time of discharge .  SW to follow Electronically signed by GASPER Mcbride on 7/20/2018 at 1:12 PM

## 2018-07-20 NOTE — PLAN OF CARE
Problem: OXYGENATION/RESPIRATORY FUNCTION  Goal: Patient will maintain patent airway  Outcome: Met This Shift      Problem: HEMODYNAMIC STATUS  Goal: Patient has stable vital signs and fluid balance  Outcome: Met This Shift      Problem: FLUID AND ELECTROLYTE IMBALANCE  Goal: Fluid and electrolyte balance are achieved/maintained  Outcome: Met This Shift

## 2018-07-20 NOTE — PLAN OF CARE
Problem: Falls - Risk of:  Goal: Will remain free from falls  Will remain free from falls   Outcome: Met This Shift    Goal: Absence of physical injury  Absence of physical injury   Outcome: Met This Shift      Problem: OXYGENATION/RESPIRATORY FUNCTION  Goal: Patient will maintain patent airway  Outcome: Met This Shift    Goal: Patient will achieve/maintain normal respiratory rate/effort  Respiratory rate and effort will be within normal limits for the patient   Outcome: Met This Shift

## 2018-07-20 NOTE — PROGRESS NOTES
OCCUPATIONAL THERAPY INITIAL EVALUATION      Date:2018  Patient Name: Harper Osgood  MRN: [id number]  : 1940  Room: [address] [place], [place] [address]     Evaluating OT: Prabha Nuñez OTR/VERENICE [id number]    Recommended Adaptive Equipment: TBD     AM-PAC Inpatient Daily Activity Raw Score:   G code: CL    Diagnosis: CHF, MS past 30 years  Surgery:   Past Surgical History:   Procedure Laterality Date    CARDIAC DEFIBRILLATOR PLACEMENT      PACEMAKER PLACEMENT      pacer/aicd- [address]/ replaced 1x since for battery change    FL OPEN RX PERIARTIC FX/DISLOC ELBOW Left 2018    LEFT OLECRANON OPEN REDUCTION INTERNAL FIXATION performed by Olimpia Gonzalez MD at [facility] [address] Left 2014      Past Medical History:   Past Medical History:   Diagnosis Date    Atrial fibrillation ([facility]     pacer/aicd    Bronchitis     multiple times,  2013, 2014. - no issues currently     CAD (coronary artery disease)     CHF (congestive heart failure) ([facility]         Fall     fractured L elbow- for OR 4-5-18     Glaucoma of both eyes     Gout     Hyperlipidemia     Hypertension     pt states controlled    Laceration of left wrist     splint to L arm     MS (multiple sclerosis) (HCC)     past 30 years; denies recent problems    Neuropathy of both feet     Skin cancer of forehead      Precautions: falls, bed alarm     Home Living: Pt lives alone in a 2 story home with 3 steps to enter and 1 rail(s); bed/bath on second floor with half bath on first  Bathroom setup: traditional toilet, sponge bathes  Equipment owned: FWW    Prior Level of Function: I with ADLs;  MI with IADLs. MI for ambulation. Pt stated she has fallen 50 times in the past 3 months.   SHe has been sponge bathing with paper towels and has a hard time getting to the rest room  Driving: no  Occupation: homemaker    Pain Level: pt c/o pain in R rib this session     Cognition: oriented x 3  [facility] Vision/Perception  Hearing: WFL  Vision: Grossly WFL ; Glasses: yes [] no [] reading []  Perception: WFL grossly      UE Assessment:  Hand Dominance: Right []  Left []     ROM Strength Additional Info:    RUE  AROM 0-90* shld flex, WFL distal  3/5 3+/5  and Fair FMC/dexterity noted during ADL tasks     LUE AROM 0-90* shld flex, WFL distal  3/5 3+/5  and Fair FMC/dexterity noted during ADL tasks     Sensation: WFL  Tone: WFL   Edema:BLE    Functional Assessment:   Initial Status 7-20-18 Comments   Feeding  Set up    Grooming  Mod A  Seated in chair   Upper Body Dressing Mod      Lower Body Dressing Max A     Bathing DNT    Toileting  Mod A     Bed Mobility  Supine to Sit: Min A   Sit to Supine:Min A     Functional Transfers Sit to stand Min A   Min A with FWW   Functional Mobility 4-5 steps along EOB with Mod A for directional concepts and manuvering walker      Sit balance: Fair+  Stand balance: Fair  Endurance/Activity tolerance: Poor                     Comments: Upon arrival pt supine in bed with bed alarm on . At end of session pt supine in bed with bed alarm on with all devices within reach, all lines and tubes intact.      Treatment: supine to sit with Min A, Dep LB doff/merlin sock, Min A sit to stand, Mod A side step aloing EOB with FWW    Assessment of current deficits   Functional mobility [x]  ROM [] Strength [x]  Cognition []  ADLs [x]   IADLs [x] Safety Awareness [] Endurance [x]  Fine Motor Coordination [] Balance [x] Vision/perception [] Sensation []   Gross Motor Coordination []     Eval Complexity: low  Profile and History- brief  Assessment of Occupational Performance and Identification of Deficits- 5+  Clinical Decision Making- low    Treatment frequency:PRN 1-3 tx per week      Plan of Care:   ADL retraining [x]   Equipment needs [x]   Neuromuscular re-education [] Energy Conservation Techniques [x]  Functional Transfer training [x] Patient and/or Family Education [x]  Functional

## 2018-07-20 NOTE — ED PROVIDER NOTES
compared to patient's most recent EKG      ------------------------- NURSING NOTES AND VITALS REVIEWED ---------------------------  Date / Time Roomed:  7/19/2018 10:16 PM  ED Bed Assignment:  21/21    The nursing notes within the ED encounter and vital signs as below have been reviewed. Patient Vitals for the past 24 hrs:   BP Temp Pulse Resp SpO2 Height Weight   07/19/18 2330 100/65 - 75 - 97 % - -   07/19/18 2315 96/66 - 75 - 97 % - -   07/19/18 2300 101/64 - 75 - 96 % - -   07/19/18 2230 100/60 - 74 - 91 % - -   07/19/18 2223 100/60 98.2 °F (36.8 °C) 75 22 91 % 5' 4\" (1.626 m) 140 lb (63.5 kg)       Oxygen Saturation Interpretation: Improved with O2    ------------------------------------------ PROGRESS NOTES ------------------------------------------  Re-evaluation(s):  Time: 0005  Patients symptoms show no change  Repeat physical examination is not changed    Counseling:  I have spoken with the patient and discussed todays results, in addition to providing specific details for the plan of care and counseling regarding the diagnosis and prognosis. Their questions are answered at this time and they are agreeable with the plan of admission.    --------------------------------- ADDITIONAL PROVIDER NOTES ---------------------------------  Consultations:  Time: 0014. Spoke with Dr. Melyssa Vazquez. Discussed case. They will admit the patient. This patient's ED course included: a personal history and physicial examination, re-evaluation prior to disposition, multiple bedside re-evaluations, IV medications, cardiac monitoring and continuous pulse oximetry    This patient has remained hemodynamically stable during their ED course. Diagnosis:  1. Acute on chronic congestive heart failure, unspecified congestive heart failure type (HCC)    2. Leg swelling    3. CHASTITY (acute kidney injury) ([facility]        Disposition:  Patient's disposition: Admit to telemetry  Patient's condition is stable.            Elizabeth Herrera

## 2018-07-20 NOTE — PROGRESS NOTES
understanding Pt demonstrated skill Pt requires further education in this area   yes yes yes     Rehab potential is Good for reaching above PT goals. Pts/ family goals   1. To get stronger so she can go home. Patient and or family understand(s) diagnosis, prognosis, and plan of care. PLAN  PT care will be provided in accordance with the objectives noted above. Whenever appropriate, clear delegation orders will be provided for nursing staff. Exercises and functional mobility practice will be used as well as appropriate assistive devices or modalities to obtain goals. Patient and family education will also be administered as needed. Frequency of treatments will be 2-5x/week x 10-14 days. Time in: 13:20  Time out: 13:50    Jonas Delaney., P.T.    License number:  [id number]

## 2018-07-21 PROBLEM — G62.9 NEUROPATHY: Status: ACTIVE | Noted: 2018-07-21

## 2018-07-21 PROBLEM — G35 MS (MULTIPLE SCLEROSIS) (HCC): Status: ACTIVE | Noted: 2018-07-21

## 2018-07-21 PROCEDURE — 6370000000 HC RX 637 (ALT 250 FOR IP): Performed by: INTERNAL MEDICINE

## 2018-07-21 PROCEDURE — 2060000000 HC ICU INTERMEDIATE R&B

## 2018-07-21 PROCEDURE — 6360000002 HC RX W HCPCS: Performed by: INTERNAL MEDICINE

## 2018-07-21 PROCEDURE — 6360000002 HC RX W HCPCS: Performed by: EMERGENCY MEDICINE

## 2018-07-21 PROCEDURE — 2580000003 HC RX 258: Performed by: INTERNAL MEDICINE

## 2018-07-21 RX ADMIN — FUROSEMIDE 40 MG: 10 INJECTION, SOLUTION INTRAMUSCULAR; INTRAVENOUS at 18:05

## 2018-07-21 RX ADMIN — FUROSEMIDE 40 MG: 10 INJECTION, SOLUTION INTRAMUSCULAR; INTRAVENOUS at 10:59

## 2018-07-21 RX ADMIN — Medication 10 ML: at 19:59

## 2018-07-21 RX ADMIN — Medication 10 ML: at 11:00

## 2018-07-21 RX ADMIN — ENOXAPARIN SODIUM 30 MG: 30 INJECTION SUBCUTANEOUS at 10:59

## 2018-07-21 RX ADMIN — DIGOXIN 125 MCG: 125 TABLET ORAL at 10:59

## 2018-07-21 RX ADMIN — CARVEDILOL 6.25 MG: 6.25 TABLET, FILM COATED ORAL at 18:05

## 2018-07-21 ASSESSMENT — PAIN SCALES - GENERAL
PAINLEVEL_OUTOF10: 0

## 2018-07-21 NOTE — PROGRESS NOTES
spironolactone, or Entresto given creatinine of 1.9 and concomitant intravenous diuresis. 3. Defibrillatorno acute issues. 4. Chronic kidney diseasemonitor daily creatinine levels. 5. Continue to follow.

## 2018-07-21 NOTE — PROGRESS NOTES
Subjective:Generalized weakness. Still easily dyspneic. The patient is awake and alert. No problems overnight. Denies chest pain, angina, and dyspnea. Denies abdominal pain. Tolerating diet. No nausea or vomiting. Objective:    /62   Pulse 73   Temp 97.7 °F (36.5 °C) (Oral)   Resp 20   Ht 5' 4\" (1.626 m)   Wt 150 lb (68 kg)   SpO2 93%   BMI 25.75 kg/m²     Heart:  RRR, no murmurs, gallops, or rubs. Lungs:  CTA bilaterally, but, diminished breath sounds in bases. Abd: bowel sounds present, nontender, nondistended, no masses  Extrem:  No clubbing, cyanosis, or edema    CBC:   Lab Results   Component Value Date    WBC 6.7 07/19/2018    RBC 3.06 07/19/2018    HGB 10.9 07/19/2018    HCT 32.8 07/19/2018    .2 07/19/2018    MCH 35.6 07/19/2018    MCHC 33.2 07/19/2018    RDW 18.0 07/19/2018     07/19/2018    MPV 10.5 07/19/2018     BMP:    Lab Results   Component Value Date     07/19/2018    K 4.5 07/19/2018    K 3.7 04/05/2018    CL 96 07/19/2018    CO2 22 07/19/2018     07/19/2018    LABALBU 4.1 07/19/2018    CREATININE 1.9 07/19/2018    CALCIUM 8.7 07/19/2018    GFRAA 31 07/19/2018    LABGLOM 26 07/19/2018    GLUCOSE 127 07/19/2018        Assessment:  Multiple problems here re CHF/Cardiomyopathy complicating her underlying weakness, Peripheral neuropathy, gait weakness, fall-risk concurrent issues. (PT has recommended short rehab stint).     Patient Active Problem List   Diagnosis    S/P ICD (internal cardiac defibrillator) procedure    Non-ischemic cardiomyopathy ([address]    Biventricular implantable cardioverter-defibrillator in situ    Chronic atrial fibrillation (HCC)    Inadequate anticoagulation    Acute on chronic congestive heart failure (HCC)    Congestive heart failure with unknown left ventricular ejection fraction ([address]    Mitral valve disorder    Type 2 diabetes mellitus without complication ([address]    Fall       Plan:  Diuresis and med adjustment in progress.           Isabel Arita  6:59 AM  7/21/2018

## 2018-07-22 LAB
ANION GAP SERPL CALCULATED.3IONS-SCNC: 13 MMOL/L (ref 7–16)
BUN BLDV-MCNC: 88 MG/DL (ref 8–23)
CALCIUM SERPL-MCNC: 8.6 MG/DL (ref 8.6–10.2)
CHLORIDE BLD-SCNC: 101 MMOL/L (ref 98–107)
CO2: 25 MMOL/L (ref 22–29)
CREAT SERPL-MCNC: 1.5 MG/DL (ref 0.5–1)
GFR AFRICAN AMERICAN: 41
GFR NON-AFRICAN AMERICAN: 34 ML/MIN/1.73
GLUCOSE BLD-MCNC: 103 MG/DL (ref 74–109)
POTASSIUM SERPL-SCNC: 4.9 MMOL/L (ref 3.5–5)
SODIUM BLD-SCNC: 139 MMOL/L (ref 132–146)

## 2018-07-22 PROCEDURE — 36415 COLL VENOUS BLD VENIPUNCTURE: CPT

## 2018-07-22 PROCEDURE — 80048 BASIC METABOLIC PNL TOTAL CA: CPT

## 2018-07-22 PROCEDURE — 6370000000 HC RX 637 (ALT 250 FOR IP): Performed by: INTERNAL MEDICINE

## 2018-07-22 PROCEDURE — 6360000002 HC RX W HCPCS: Performed by: EMERGENCY MEDICINE

## 2018-07-22 PROCEDURE — 2580000003 HC RX 258: Performed by: INTERNAL MEDICINE

## 2018-07-22 PROCEDURE — 2060000000 HC ICU INTERMEDIATE R&B

## 2018-07-22 RX ADMIN — ENOXAPARIN SODIUM 30 MG: 30 INJECTION SUBCUTANEOUS at 08:07

## 2018-07-22 RX ADMIN — Medication 10 ML: at 08:07

## 2018-07-22 RX ADMIN — CARVEDILOL 6.25 MG: 6.25 TABLET, FILM COATED ORAL at 16:47

## 2018-07-22 RX ADMIN — CARVEDILOL 6.25 MG: 6.25 TABLET, FILM COATED ORAL at 08:07

## 2018-07-22 RX ADMIN — DIGOXIN 125 MCG: 125 TABLET ORAL at 08:07

## 2018-07-22 RX ADMIN — Medication 10 ML: at 19:52

## 2018-07-22 ASSESSMENT — PAIN SCALES - GENERAL
PAINLEVEL_OUTOF10: 0

## 2018-07-22 NOTE — PROGRESS NOTES
DAILY PROGRESS NOTE - [facility]    SUBJECTIVE:    Patient being followed for acute systolic heart failure. 82-year-old female with severe nonischemic cardiomyopathy with LVEF 25%, 3+ MR, moderate pulmonary hypertension, biventricular ICD followed by Dr. Iron Graves, admitted with worsening dyspnea and bilateral lower extremity edema. Being diuresed with intravenous furosemide and is now over 2 L negative since admission. On intravenous furosemide. Less dyspnea and feels that her lower extremity edema is improving. OBJECTIVE:    Her vital signs were reviewed today. Vitals:    07/21/18 1958   BP: 105/66   Pulse: 75   Resp: 16   Temp: 98.5 °F (36.9 °C)   SpO2: 98%       Scheduled Meds:   sodium chloride flush  10 mL Intravenous 2 times per day    enoxaparin  30 mg Subcutaneous Daily    digoxin  125 mcg Oral Daily    carvedilol  6.25 mg Oral BID WC     Continuous Infusions:  PRN Meds:.sodium chloride flush, acetaminophen      PHYSICAL EXAM:    General Appearance:  awake, alert, oriented, in no acute distress  Neck:  no bruits  Lungs:  Normal expansion. Decreased breath sounds at bases bilaterally to auscultation. No rales, rhonchi, or wheezing. Heart:  Heart sounds are normal.  Regular rate and rhythm without murmur, gallop or rub. Abdomen:  Soft, non-tender. Extremities: Extremities warm to touch, pink, with 1+ bilateral lower extremity pitting edema, improved as compared to yesterday 7/21  Neuro/musculosketal:  Unremarkable. LABS:    Recent Labs      07/19/18   2300   NA  136   CREATININE  1.9*       Recent Labs      07/19/18   2300   HGB  10.9*       No results for input(s): INR in the last 72 hours. IMPRESSION:    1. Acute on chronic systolic heart failurediuresed A little over 2 L and edema has improved although still present. Check daily creatinine levels, and BMP, and continue intravenous furosemide at current dosage.   Recommend that she have a dietary consult for salt and oral

## 2018-07-23 LAB
ANION GAP SERPL CALCULATED.3IONS-SCNC: 13 MMOL/L (ref 7–16)
BUN BLDV-MCNC: 83 MG/DL (ref 8–23)
CALCIUM SERPL-MCNC: 8.3 MG/DL (ref 8.6–10.2)
CHLORIDE BLD-SCNC: 99 MMOL/L (ref 98–107)
CO2: 23 MMOL/L (ref 22–29)
CREAT SERPL-MCNC: 1.4 MG/DL (ref 0.5–1)
GFR AFRICAN AMERICAN: 44
GFR NON-AFRICAN AMERICAN: 37 ML/MIN/1.73
GLUCOSE BLD-MCNC: 121 MG/DL (ref 74–109)
POTASSIUM SERPL-SCNC: 4.5 MMOL/L (ref 3.5–5)
SODIUM BLD-SCNC: 135 MMOL/L (ref 132–146)

## 2018-07-23 PROCEDURE — 6360000002 HC RX W HCPCS: Performed by: INTERNAL MEDICINE

## 2018-07-23 PROCEDURE — 80048 BASIC METABOLIC PNL TOTAL CA: CPT

## 2018-07-23 PROCEDURE — 2060000000 HC ICU INTERMEDIATE R&B

## 2018-07-23 PROCEDURE — 2580000003 HC RX 258: Performed by: INTERNAL MEDICINE

## 2018-07-23 PROCEDURE — 6370000000 HC RX 637 (ALT 250 FOR IP): Performed by: INTERNAL MEDICINE

## 2018-07-23 PROCEDURE — 36415 COLL VENOUS BLD VENIPUNCTURE: CPT

## 2018-07-23 RX ORDER — FUROSEMIDE 10 MG/ML
40 INJECTION INTRAMUSCULAR; INTRAVENOUS ONCE
Status: COMPLETED | OUTPATIENT
Start: 2018-07-23 | End: 2018-07-23

## 2018-07-23 RX ORDER — FUROSEMIDE 10 MG/ML
40 INJECTION INTRAMUSCULAR; INTRAVENOUS 2 TIMES DAILY
Status: DISCONTINUED | OUTPATIENT
Start: 2018-07-23 | End: 2018-07-24

## 2018-07-23 RX ADMIN — DIGOXIN 125 MCG: 125 TABLET ORAL at 09:38

## 2018-07-23 RX ADMIN — Medication 10 ML: at 09:38

## 2018-07-23 RX ADMIN — FUROSEMIDE 40 MG: 10 INJECTION, SOLUTION INTRAVENOUS at 06:37

## 2018-07-23 RX ADMIN — CARVEDILOL 6.25 MG: 6.25 TABLET, FILM COATED ORAL at 16:42

## 2018-07-23 RX ADMIN — Medication 10 ML: at 21:50

## 2018-07-23 RX ADMIN — FUROSEMIDE 40 MG: 10 INJECTION, SOLUTION INTRAVENOUS at 18:33

## 2018-07-23 RX ADMIN — FUROSEMIDE 40 MG: 10 INJECTION, SOLUTION INTRAVENOUS at 09:38

## 2018-07-23 RX ADMIN — CARVEDILOL 6.25 MG: 6.25 TABLET, FILM COATED ORAL at 07:48

## 2018-07-23 ASSESSMENT — PAIN SCALES - GENERAL
PAINLEVEL_OUTOF10: 0

## 2018-07-23 NOTE — PROGRESS NOTES
daily creatinine levels, and BMP, and continue intravenous furosemide 40 bid time 3 more doses. Recommend that she have a dietary consult for salt and oral fluid management. 2. Nonischemic cardiomyopathycontinue carvedilol but no ACE inhibitor, spironolactone, or Entresto given creatinine of 1.9 and concomitant intravenous diuresis. 3. Defibrillatorno acute issues. 4. Chronic kidney diseasemonitor daily creatinine levels. 5. Continue to follow.

## 2018-07-23 NOTE — PROGRESS NOTES
[facility] Quality Flow/Interdisciplinary Rounds Progress Note        Quality Flow Rounds held on July 23, 2018    Disciplines Attending:  Bedside Nurse, ,  and Nursing Unit Leadership    Usama Herrera was admitted on 7/19/2018 10:16 PM    Anticipated Discharge Date:  Expected Discharge Date: 07/24/18    Disposition:    Sukhdev Score:  Sukhdev Scale Score: 18    Readmission Risk              Risk of Unplanned Readmission:        15             Discussed patient goal for the day, patient clinical progression, and barriers to discharge. The following Goal(s) of the Day/Commitment(s) have been identified:  Waiting on rehab acceptance, activity as tolerated.       Akash Metzger  July 23, 2018

## 2018-07-24 VITALS
HEART RATE: 88 BPM | OXYGEN SATURATION: 97 % | RESPIRATION RATE: 18 BRPM | HEIGHT: 64 IN | DIASTOLIC BLOOD PRESSURE: 60 MMHG | BODY MASS INDEX: 26.41 KG/M2 | WEIGHT: 154.7 LBS | TEMPERATURE: 98.8 F | SYSTOLIC BLOOD PRESSURE: 110 MMHG

## 2018-07-24 LAB
ANION GAP SERPL CALCULATED.3IONS-SCNC: 13 MMOL/L (ref 7–16)
BUN BLDV-MCNC: 81 MG/DL (ref 8–23)
CALCIUM SERPL-MCNC: 8.7 MG/DL (ref 8.6–10.2)
CHLORIDE BLD-SCNC: 99 MMOL/L (ref 98–107)
CO2: 27 MMOL/L (ref 22–29)
CREAT SERPL-MCNC: 1.3 MG/DL (ref 0.5–1)
GFR AFRICAN AMERICAN: 48
GFR NON-AFRICAN AMERICAN: 40 ML/MIN/1.73
GLUCOSE BLD-MCNC: 166 MG/DL (ref 74–109)
POTASSIUM SERPL-SCNC: 4.2 MMOL/L (ref 3.5–5)
SODIUM BLD-SCNC: 139 MMOL/L (ref 132–146)

## 2018-07-24 PROCEDURE — 36415 COLL VENOUS BLD VENIPUNCTURE: CPT

## 2018-07-24 PROCEDURE — 80048 BASIC METABOLIC PNL TOTAL CA: CPT

## 2018-07-24 PROCEDURE — 6370000000 HC RX 637 (ALT 250 FOR IP): Performed by: INTERNAL MEDICINE

## 2018-07-24 PROCEDURE — 2580000003 HC RX 258: Performed by: INTERNAL MEDICINE

## 2018-07-24 PROCEDURE — 97530 THERAPEUTIC ACTIVITIES: CPT

## 2018-07-24 PROCEDURE — 97535 SELF CARE MNGMENT TRAINING: CPT

## 2018-07-24 RX ORDER — TORSEMIDE 20 MG/1
20 TABLET ORAL 2 TIMES DAILY
Status: DISCONTINUED | OUTPATIENT
Start: 2018-07-24 | End: 2018-07-24 | Stop reason: HOSPADM

## 2018-07-24 RX ORDER — DIGOXIN 125 MCG
125 TABLET ORAL DAILY
Qty: 30 TABLET | Refills: 3 | Status: SHIPPED | OUTPATIENT
Start: 2018-07-25

## 2018-07-24 RX ADMIN — CARVEDILOL 6.25 MG: 6.25 TABLET, FILM COATED ORAL at 08:30

## 2018-07-24 RX ADMIN — DIGOXIN 125 MCG: 125 TABLET ORAL at 08:32

## 2018-07-24 RX ADMIN — TORSEMIDE 20 MG: 20 TABLET ORAL at 15:08

## 2018-07-24 RX ADMIN — CARVEDILOL 6.25 MG: 6.25 TABLET, FILM COATED ORAL at 15:07

## 2018-07-24 RX ADMIN — TORSEMIDE 20 MG: 20 TABLET ORAL at 08:36

## 2018-07-24 RX ADMIN — Medication 10 ML: at 08:35

## 2018-07-24 ASSESSMENT — PAIN SCALES - GENERAL
PAINLEVEL_OUTOF10: 0
PAINLEVEL_OUTOF10: 0

## 2018-07-24 NOTE — PROGRESS NOTES
Occupational Therapy  OT BEDSIDE TREATMENT NOTE      Date:2018  Patient Name: Nate Hernández  MRN: [id number]  : 1940  Room: [address] [place], [place] [address]     Evaluating OT: Carolyn Marlow OTR/VERENICE [id number]     Recommended Adaptive Equipment: TBD      AM-PAC Inpatient Daily Activity Raw Score: 17  G code: Ck     Diagnosis: CHF, MS past 30 years  Surgery:   Past Surgical History         Past Surgical History:   Procedure Laterality Date    CARDIAC DEFIBRILLATOR PLACEMENT        PACEMAKER PLACEMENT         pacer/aicd- medtronic 2005/ replaced 1x since for battery change    NE OPEN RX PERIARTIC FX/DISLOC ELBOW Left 2018     LEFT OLECRANON OPEN REDUCTION INTERNAL FIXATION performed by Satish Jenkins MD at [address] [place] [address] Drive WRIST SURGERY Left 2014         Past Medical History:   Past Medical History        Past Medical History:   Diagnosis Date    Atrial fibrillation [facility])       pacer/aicd    Bronchitis       multiple times,  2013, 2014. - no issues currently     CAD (coronary artery disease)      CHF (congestive heart failure) ([facility]           Fall       fractured L elbow- for OR 4-5-18     Glaucoma of both eyes      Gout      Hyperlipidemia      Hypertension       pt states controlled    Laceration of left wrist       splint to L arm     MS (multiple sclerosis) (HCC)       past 30 years; denies recent problems    Neuropathy of both feet      Skin cancer of forehead           Precautions: falls, bed alarm     Home Living: Pt lives alone in a 2 story home with 3 steps to enter and 1 rail(s); bed/bath on second floor with half bath on first  Bathroom setup: traditional toilet, sponge bathes  Equipment owned: FWW     Prior Level of Function: I with ADLs;  MI with IADLs. MI for ambulation. Pt stated she has fallen 50 times in the past 3 months.   SHe has been sponge bathing with paper towels and has a hard time getting to the rest room  Driving: no  Occupation:

## 2018-07-24 NOTE — CARE COORDINATION
Social work / Discharge Planning:       RN updated social work that the patient is now saying she is going home today and does not want [facility]. Social work met with patient. She is insisting that she is going home today to take care of her 9 cats. Social work attempted to explain the benefit of [facility] and the fact that she was only able to ambulate 20 feet with therapy. She states that she will be fine and will not consider any other plan than home. Patient is in agreement for home care and prefers [facility]. Referral made to liaison. Per patient she does not have home 02. She has no prefers [facility]. Referral called to liaison and information faxed.   Social work contacted the liaison for [employer] and cancelled referral.  Electronically signed by GASPER Corey on 7/24/2018 at 9:45 AM

## 2018-07-24 NOTE — PROGRESS NOTES
in a bedside chair with call light in reach    Treatment time: 14 minutes  Time out: 0940    Pt is making good progress toward established Physical Therapy goals as per increased gait distance performed. Continue with physical therapy current plan of care.     Mateo Farmer PTA   License Number: [id number]

## 2018-07-24 NOTE — PROGRESS NOTES
Call placed to Dr. Benoit Sessions office regarding discharge, patient verbalized to attending RN and  that she is [place] today, no matter what\". Ok to discharge per Cardiology, awaiting response.   Electronically signed by Derek Holloway RN on 7/24/2018 at 10:03 AM

## 2018-07-24 NOTE — PROGRESS NOTES
[facility] Quality Flow/Interdisciplinary Rounds Progress Note        Quality Flow Rounds held on July 24, 2018    Disciplines Attending:  Bedside Nurse, ,  and Nursing Unit Leadership    Javier Phan was admitted on 7/19/2018 10:16 PM    Anticipated Discharge Date:  Expected Discharge Date: 07/24/18    Disposition:    Sukhdev Score:  Sukhdev Scale Score: 18    Readmission Risk              Risk of Unplanned Readmission:        15             Discussed patient goal for the day, patient clinical progression, and barriers to discharge. The following Goal(s) of the Day/Commitment(s) have been identified:  Wean IV diretics to PO, waiting on pre-cert to [facility].       Redge Harness  July 24, 2018

## 2018-07-25 ENCOUNTER — HOSPITAL ENCOUNTER (INPATIENT)
Age: 78
LOS: 7 days | Discharge: NURSING, REHAB OR LONG-TERM CARE | DRG: 291 | End: 2018-08-01
Attending: EMERGENCY MEDICINE | Admitting: INTERNAL MEDICINE
Payer: MEDICARE

## 2018-07-25 ENCOUNTER — APPOINTMENT (OUTPATIENT)
Dept: GENERAL RADIOLOGY | Age: 78
DRG: 291 | End: 2018-07-25
Payer: MEDICARE

## 2018-07-25 ENCOUNTER — APPOINTMENT (OUTPATIENT)
Dept: CT IMAGING | Age: 78
DRG: 291 | End: 2018-07-25
Payer: MEDICARE

## 2018-07-25 DIAGNOSIS — I50.9 ACUTE ON CHRONIC CONGESTIVE HEART FAILURE, UNSPECIFIED CONGESTIVE HEART FAILURE TYPE: ICD-10-CM
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
## 2018-07-25 PROBLEM — I50.23 ACUTE ON CHRONIC SYSTOLIC CHF (CONGESTIVE HEART FAILURE) (HCC): Status: ACTIVE | Noted: 2018-07-25

## 2018-07-25 LAB
ALBUMIN SERPL-MCNC: 3.9 G/DL (ref 3.5–5.2)
ALP BLD-CCNC: 186 U/L (ref 35–104)
ALT SERPL-CCNC: 24 U/L (ref 0–32)
ANION GAP SERPL CALCULATED.3IONS-SCNC: 16 MMOL/L (ref 7–16)
AST SERPL-CCNC: 37 U/L (ref 0–31)
BASOPHILS ABSOLUTE: 0.03 E9/L (ref 0–0.2)
BASOPHILS RELATIVE PERCENT: 0.5 % (ref 0–2)
BILIRUB SERPL-MCNC: 1.3 MG/DL (ref 0–1.2)
BUN BLDV-MCNC: 90 MG/DL (ref 8–23)
CALCIUM SERPL-MCNC: 8.9 MG/DL (ref 8.6–10.2)
CHLORIDE BLD-SCNC: 98 MMOL/L (ref 98–107)
CO2: 24 MMOL/L (ref 22–29)
CREAT SERPL-MCNC: 1.4 MG/DL (ref 0.5–1)
EOSINOPHILS ABSOLUTE: 0.11 E9/L (ref 0.05–0.5)
EOSINOPHILS RELATIVE PERCENT: 2 % (ref 0–6)
GFR AFRICAN AMERICAN: 44
GFR NON-AFRICAN AMERICAN: 37 ML/MIN/1.73
GLUCOSE BLD-MCNC: 99 MG/DL (ref 74–109)
HCT VFR BLD CALC: 33.8 % (ref 34–48)
HEMOGLOBIN: 11.7 G/DL (ref 11.5–15.5)
IMMATURE GRANULOCYTES #: 0.01 E9/L
IMMATURE GRANULOCYTES %: 0.2 % (ref 0–5)
LYMPHOCYTES ABSOLUTE: 0.91 E9/L (ref 1.5–4)
LYMPHOCYTES RELATIVE PERCENT: 16.4 % (ref 20–42)
MCH RBC QN AUTO: 36.1 PG (ref 26–35)
MCHC RBC AUTO-ENTMCNC: 34.6 % (ref 32–34.5)
MCV RBC AUTO: 104.3 FL (ref 80–99.9)
MONOCYTES ABSOLUTE: 0.74 E9/L (ref 0.1–0.95)
MONOCYTES RELATIVE PERCENT: 13.3 % (ref 2–12)
NEUTROPHILS ABSOLUTE: 3.75 E9/L (ref 1.8–7.3)
NEUTROPHILS RELATIVE PERCENT: 67.6 % (ref 43–80)
PDW BLD-RTO: 17.2 FL (ref 11.5–15)
PLATELET # BLD: 139 E9/L (ref 130–450)
PMV BLD AUTO: 9.9 FL (ref 7–12)
POTASSIUM SERPL-SCNC: 4.4 MMOL/L (ref 3.5–5)
PRO-BNP: ABNORMAL PG/ML (ref 0–450)
RBC # BLD: 3.24 E12/L (ref 3.5–5.5)
SODIUM BLD-SCNC: 138 MMOL/L (ref 132–146)
TOTAL PROTEIN: 7 G/DL (ref 6.4–8.3)
TROPONIN: 0.16 NG/ML (ref 0–0.03)
TROPONIN: 0.18 NG/ML (ref 0–0.03)
TROPONIN: 0.2 NG/ML (ref 0–0.03)
WBC # BLD: 5.6 E9/L (ref 4.5–11.5)

## 2018-07-25 PROCEDURE — 71045 X-RAY EXAM CHEST 1 VIEW: CPT

## 2018-07-25 PROCEDURE — 36415 COLL VENOUS BLD VENIPUNCTURE: CPT

## 2018-07-25 PROCEDURE — 72125 CT NECK SPINE W/O DYE: CPT

## 2018-07-25 PROCEDURE — 83880 ASSAY OF NATRIURETIC PEPTIDE: CPT

## 2018-07-25 PROCEDURE — 6360000002 HC RX W HCPCS: Performed by: INTERNAL MEDICINE

## 2018-07-25 PROCEDURE — 2140000000 HC CCU INTERMEDIATE R&B

## 2018-07-25 PROCEDURE — 70450 CT HEAD/BRAIN W/O DYE: CPT

## 2018-07-25 PROCEDURE — 6370000000 HC RX 637 (ALT 250 FOR IP): Performed by: INTERNAL MEDICINE

## 2018-07-25 PROCEDURE — G8988 SELF CARE GOAL STATUS: HCPCS

## 2018-07-25 PROCEDURE — 97166 OT EVAL MOD COMPLEX 45 MIN: CPT

## 2018-07-25 PROCEDURE — 80053 COMPREHEN METABOLIC PANEL: CPT

## 2018-07-25 PROCEDURE — 94761 N-INVAS EAR/PLS OXIMETRY MLT: CPT

## 2018-07-25 PROCEDURE — 97535 SELF CARE MNGMENT TRAINING: CPT

## 2018-07-25 PROCEDURE — 96374 THER/PROPH/DIAG INJ IV PUSH: CPT

## 2018-07-25 PROCEDURE — 2580000003 HC RX 258: Performed by: INTERNAL MEDICINE

## 2018-07-25 PROCEDURE — 6360000002 HC RX W HCPCS: Performed by: ANESTHESIOLOGY

## 2018-07-25 PROCEDURE — 84484 ASSAY OF TROPONIN QUANT: CPT

## 2018-07-25 PROCEDURE — 99285 EMERGENCY DEPT VISIT HI MDM: CPT

## 2018-07-25 PROCEDURE — 85025 COMPLETE CBC W/AUTO DIFF WBC: CPT

## 2018-07-25 PROCEDURE — G8987 SELF CARE CURRENT STATUS: HCPCS

## 2018-07-25 RX ORDER — CARBAMAZEPINE 200 MG/1
200 TABLET ORAL 2 TIMES DAILY
Status: DISCONTINUED | OUTPATIENT
Start: 2018-07-25 | End: 2018-07-28

## 2018-07-25 RX ORDER — FUROSEMIDE 10 MG/ML
40 INJECTION INTRAMUSCULAR; INTRAVENOUS ONCE
Status: COMPLETED | OUTPATIENT
Start: 2018-07-25 | End: 2018-07-25

## 2018-07-25 RX ORDER — ASPIRIN 81 MG/1
81 TABLET, CHEWABLE ORAL DAILY
Status: DISCONTINUED | OUTPATIENT
Start: 2018-07-25 | End: 2018-08-01 | Stop reason: HOSPADM

## 2018-07-25 RX ORDER — DIGOXIN 125 MCG
125 TABLET ORAL DAILY
Status: DISCONTINUED | OUTPATIENT
Start: 2018-07-25 | End: 2018-08-01 | Stop reason: HOSPADM

## 2018-07-25 RX ORDER — FUROSEMIDE 10 MG/ML
40 INJECTION INTRAMUSCULAR; INTRAVENOUS 2 TIMES DAILY
Status: DISCONTINUED | OUTPATIENT
Start: 2018-07-25 | End: 2018-07-28

## 2018-07-25 RX ORDER — SODIUM CHLORIDE 0.9 % (FLUSH) 0.9 %
10 SYRINGE (ML) INJECTION PRN
Status: DISCONTINUED | OUTPATIENT
Start: 2018-07-25 | End: 2018-07-28 | Stop reason: SDUPTHER

## 2018-07-25 RX ORDER — PHENYTOIN SODIUM 100 MG/1
100 CAPSULE, EXTENDED RELEASE ORAL 2 TIMES DAILY
Status: DISCONTINUED | OUTPATIENT
Start: 2018-07-25 | End: 2018-07-28

## 2018-07-25 RX ORDER — ONDANSETRON 2 MG/ML
4 INJECTION INTRAMUSCULAR; INTRAVENOUS EVERY 6 HOURS PRN
Status: DISCONTINUED | OUTPATIENT
Start: 2018-07-25 | End: 2018-07-28

## 2018-07-25 RX ORDER — ALLOPURINOL 100 MG/1
200 TABLET ORAL NIGHTLY
Status: DISCONTINUED | OUTPATIENT
Start: 2018-07-25 | End: 2018-07-28

## 2018-07-25 RX ORDER — DIAZEPAM 5 MG/1
5 TABLET ORAL DAILY
COMMUNITY

## 2018-07-25 RX ORDER — CARVEDILOL 6.25 MG/1
12.5 TABLET ORAL 2 TIMES DAILY WITH MEALS
Status: DISCONTINUED | OUTPATIENT
Start: 2018-07-25 | End: 2018-07-26

## 2018-07-25 RX ORDER — SODIUM CHLORIDE 0.9 % (FLUSH) 0.9 %
10 SYRINGE (ML) INJECTION EVERY 12 HOURS SCHEDULED
Status: DISCONTINUED | OUTPATIENT
Start: 2018-07-25 | End: 2018-07-28 | Stop reason: SDUPTHER

## 2018-07-25 RX ORDER — ACETAMINOPHEN 325 MG/1
650 TABLET ORAL EVERY 4 HOURS PRN
Status: DISCONTINUED | OUTPATIENT
Start: 2018-07-25 | End: 2018-07-28

## 2018-07-25 RX ADMIN — PHENYTOIN SODIUM 100 MG: 100 CAPSULE, EXTENDED RELEASE ORAL at 22:50

## 2018-07-25 RX ADMIN — ALLOPURINOL 200 MG: 100 TABLET ORAL at 22:50

## 2018-07-25 RX ADMIN — Medication 10 ML: at 18:21

## 2018-07-25 RX ADMIN — CARBAMAZEPINE 200 MG: 200 TABLET ORAL at 18:20

## 2018-07-25 RX ADMIN — ASPIRIN 81 MG CHEWABLE TABLET 81 MG: 81 TABLET CHEWABLE at 18:19

## 2018-07-25 RX ADMIN — CARBAMAZEPINE 200 MG: 200 TABLET ORAL at 22:50

## 2018-07-25 RX ADMIN — CARVEDILOL 12.5 MG: 6.25 TABLET, FILM COATED ORAL at 18:20

## 2018-07-25 RX ADMIN — FUROSEMIDE 40 MG: 10 INJECTION, SOLUTION INTRAMUSCULAR; INTRAVENOUS at 09:48

## 2018-07-25 RX ADMIN — Medication 10 ML: at 22:51

## 2018-07-25 RX ADMIN — FUROSEMIDE 40 MG: 10 INJECTION, SOLUTION INTRAMUSCULAR; INTRAVENOUS at 18:21

## 2018-07-25 RX ADMIN — PHENYTOIN SODIUM 100 MG: 100 CAPSULE, EXTENDED RELEASE ORAL at 18:20

## 2018-07-25 RX ADMIN — DIGOXIN 125 MCG: 125 TABLET ORAL at 18:20

## 2018-07-25 ASSESSMENT — PAIN SCALES - GENERAL
PAINLEVEL_OUTOF10: 0

## 2018-07-25 ASSESSMENT — ENCOUNTER SYMPTOMS
COUGH: 0
SHORTNESS OF BREATH: 1
SINUS PRESSURE: 0
NAUSEA: 0
RHINORRHEA: 0
DIARRHEA: 0
ABDOMINAL PAIN: 0
CONSTIPATION: 0
VOMITING: 0
CHEST TIGHTNESS: 0

## 2018-07-25 NOTE — ED NOTES
Patient refusing EKG and labs, states \"I just had all that done a few weeks ago, I don't need it again\". Explained purpose and importance of testing to patient. Patient continues to refuse. Patient also refuses CT scan. Patient states Henrique Prietos just made me come here to get observed\". Patient states \"I want to go home\".       Adalberto Gardner RN  07/25/18 Twan [address] 32 Haylie Alegria RN  07/25/18 2649

## 2018-07-25 NOTE — PROGRESS NOTES
Occupational Therapy  OCCUPATIONAL THERAPY INITIAL EVALUATION      Date:2018  Patient Name: Harper Osgood  MRN: [id number]  : 1940  Room: [facility]     Evaluating OT: Warren Obrien OTR/L [id number]    AM-PAC Inpatient Daily Activity Raw Score:  ; G-CODE: CK      Recommended Adaptive Equipment: TBD      Diagnosis:   Patient Active Problem List   Diagnosis    S/P ICD (internal cardiac defibrillator) procedure    Non-ischemic cardiomyopathy ([address]    Biventricular implantable cardioverter-defibrillator in situ    Chronic atrial fibrillation ([address]    Mitral valve disorder    MS (multiple sclerosis) ([address]    Neuropathy ([address]    Acute on chronic systolic CHF (congestive heart failure) ([address]       Surgery:   Past Surgical History:   Procedure Laterality Date    CARDIAC DEFIBRILLATOR PLACEMENT      PACEMAKER PLACEMENT      pacer/aicd- medtronic / replaced 1x since for battery change    DE OPEN RX PERIARTIC FX/DISLOC ELBOW Left 2018    LEFT OLECRANON OPEN REDUCTION INTERNAL FIXATION performed by Olimpia Gonzalez MD at [address] [place] [address] Left 2014        Past Medical History:   Past Medical History:   Diagnosis Date    Atrial fibrillation ([address]     pacer/aicd    Bronchitis     multiple times,  2013, 2014. - no issues currently     CAD (coronary artery disease)     CHF (congestive heart failure) ([address]         Fall     fractured L elbow- for OR 4-5-18     Glaucoma of both eyes     Gout     Hyperlipidemia     Hypertension     pt states controlled    Laceration of left wrist     splint to L arm     MS (multiple sclerosis) [facility]     past 30 years; denies recent problems    Neuropathy of both feet     Skin cancer of forehead        Precautions: falls      Home Living: Lives alone in [place] with 1 dog and 8 cats   Equipment owned: [employer]    Prior Level of Function: per pt, she is indep with ADLs/IADLs/func mobility with [employer]; has a friend that helps her with pet care       Pain Level: 0/10    Cognition: oriented x 3; follows 2 step directions. Sup Problem solving skills  indep Memory   indep Sequencing  SupSafety Awareness/Judgement  Additional Comments: N/A     Sensory:   Hearing: WFL  Vision: WFL       UE Assessment:  Hand Dominance: Right [x]  Left []     Strength ROM             Additional Info:    RUE           4/5             WFL            WFL               N/A      LUE 4/5 WFL            WFL               N/A        Sensation: intact  Tone: normal  Edema:N/A   FMC: WFL  GMC: WF    Functional Assessment:   Initial Status: Comments:                               GOALS:   Feeding  setup N/A  indep   Grooming  SBA  N/A  setup   Upper Body Dressing Saeed   N/A   SBA    Lower Body Dressing Mod A  N/A  Saeed     Bathing Mod A  N/A  Saeed     Toileting  Saeed   N/A  SBA    Bed Mobility  SBA  N/A  Sup   Functional Transfers Saeed   N/A   SBA     Functional Mobility Saeed   N/A  SBA     Pt/Family participated in establishment of the goals. Sit balance: Sup  Stand balance: Saeed    Endurance/Activity tolerance: fair                              Comments: Upon arrival, pt supine in bed. At end of session, pt supine in bed with all devices within reach. Treatment: Patient performs func tasks as reported above. OT facilitated bed mobility/func mobility/toileting. Educated pt in safety. Patient demonstrates fair understanding of education/instructions. Will benefit from continued OT intervention to increase participation in ADL tasks.      Assessment of current deficits   Functional mobility [x]  ADLs [x] Strength [x]  Cognition [x]  Functional transfers  [x] IADLs [x] Safety Awareness [x]  Endurance [x]  Fine Motor Coordination [] Balance [x] Vision/perception [] Sensation []   Gross Motor Coordination [] ROM []        Eval Complexity: mod  Profile and History- mod  Assessment of Occupational Performance and Identification of Deficits-

## 2018-07-25 NOTE — ED PROVIDER NOTES
Ms. Opal Clinton is a 69 yo F with PMHx of CHF, MS, chronic AFib who presents to the ED for a fall this morning at. She states her significant other is having a procedure this morning and took an extra valium last night. She is AOx3. She states she felt lightheaded and dizzy before falling and is usually short of breath. She states she has an ICD but cannot remember if she was shocked. She denies any chest pain, cervical pain, abdominal pain, nausea, vomiting. The history is provided by the patient. The history is limited by the condition of the patient. Review of Systems   Constitutional: Negative for chills, fatigue and fever. HENT: Negative for postnasal drip, rhinorrhea, sinus pressure and tinnitus. Respiratory: Positive for shortness of breath. Negative for cough and chest tightness. Cardiovascular: Positive for leg swelling. Negative for chest pain. Gastrointestinal: Negative for abdominal pain, constipation, diarrhea, nausea and vomiting. Genitourinary: Negative for dysuria, flank pain, frequency and hematuria. Musculoskeletal: Negative for arthralgias, myalgias and neck pain. Skin: Negative for rash and wound. Neurological: Negative for dizziness, seizures, syncope, weakness and headaches. Psychiatric/Behavioral: The patient is nervous/anxious. Physical Exam   Constitutional: She appears well-developed and well-nourished. She is cooperative. HENT:   Head: Normocephalic and atraumatic. Right Ear: External ear normal.   Left Ear: External ear normal.   Mouth/Throat: Oropharynx is clear and moist.   Eyes: EOM are normal. Pupils are equal, round, and reactive to light. Neck: Normal range of motion. Neck supple. Cardiovascular: Normal rate, regular rhythm, normal heart sounds and intact distal pulses. Pulmonary/Chest: Breath sounds normal. She is in respiratory distress. She has no wheezes. She has no rales. She exhibits no tenderness. Abdominal: Soft.  Bowel posteriorly. CT HEAD WO CONTRAST   Final Result   1. Age-related atrophic changes and chronic microvascular ischemic   disease without evidence of acute intracranial pathology. If there is concern for CVA, MRI is more sensitive for the detection   of early stages of acute ischemic infarct. XR CHEST PORTABLE   Final Result   1. Persistent bibasilar infiltrates suggestive of pleural effusions   and probable associated atelectasis. A superimposed pneumonic   infiltrate would be difficult to exclude and should be considered in   the appropriate clinical context. 2.  Query CHF pattern. EKG:  This EKG is signed and interpreted by the EP. Time: 0715  Rate: 99bpm  Interpretation: Ventricular paced rhythm with PVCs  Comparison: stable as compared to patient's most recent EKG and None      ------------------------- NURSING NOTES AND VITALS REVIEWED ---------------------------   The nursing notes within the ED encounter and vital signs as below have been reviewed by myself. BP 98/76   Pulse 89   Temp 96.8 °F (36 °C)   Resp 19   Ht 5' 7\" (1.702 m)   Wt 154 lb (69.9 kg)   SpO2 98%   BMI 24.12 kg/m²   Oxygen Saturation Interpretation: Normal    The patients available past medical records and past encounters were reviewed. ------------------------------ ED COURSE/MEDICAL DECISION MAKING----------------------  Medications   furosemide (LASIX) injection 40 mg (40 mg Intravenous Given 7/25/18 0948)         ED COURSE:     Medical Decision Making:    Ms. Opal Clinton arrived to ED after a fall this AM. Labs, imaging, EKG obtained and discussed with patient. BNP and tropoinins elevated worse than prior admission to [facility]. We discussed with patient that her condition appears worsened and to have her admitted for further evaluation. Iniitally, she was adamant to go home.  However, after further discussion and she was able to communicate with her significant --------------------------------- IMPRESSION AND DISPOSITION ---------------------------------    IMPRESSION  1. Fall, initial encounter    2. Acute on chronic congestive heart failure, unspecified congestive heart failure type ([facility]        DISPOSITION  Disposition: Admit to telemetry/inpatient  Patient condition is fair    NOTE: This report was transcribed using voice recognition software.  Every effort was made to ensure accuracy; however, inadvertent computerized transcription errors may be present         Rachel Messina DO  Resident  07/25/18 8249

## 2018-07-26 PROBLEM — I50.23 ACUTE ON CHRONIC SYSTOLIC CHF (CONGESTIVE HEART FAILURE) (HCC): Status: ACTIVE | Noted: 2018-07-26

## 2018-07-26 PROBLEM — I05.9 MITRAL VALVE DISORDER: Status: RESOLVED | Noted: 2018-07-20 | Resolved: 2018-07-26

## 2018-07-26 PROBLEM — I50.23 ACUTE ON CHRONIC SYSTOLIC CHF (CONGESTIVE HEART FAILURE) (HCC): Status: RESOLVED | Noted: 2018-07-26 | Resolved: 2018-07-26

## 2018-07-26 PROBLEM — G62.9 NEUROPATHY: Status: RESOLVED | Noted: 2018-07-21 | Resolved: 2018-07-26

## 2018-07-26 LAB
ANION GAP SERPL CALCULATED.3IONS-SCNC: 14 MMOL/L (ref 7–16)
BUN BLDV-MCNC: 94 MG/DL (ref 8–23)
CALCIUM SERPL-MCNC: 8.5 MG/DL (ref 8.6–10.2)
CARBAMAZEPINE DOSE: NORMAL
CARBAMAZEPINE LEVEL: 6.6 MCG/ML (ref 4–10)
CHLORIDE BLD-SCNC: 98 MMOL/L (ref 98–107)
CO2: 28 MMOL/L (ref 22–29)
CREAT SERPL-MCNC: 1.5 MG/DL (ref 0.5–1)
GFR AFRICAN AMERICAN: 41
GFR NON-AFRICAN AMERICAN: 34 ML/MIN/1.73
GLUCOSE BLD-MCNC: 85 MG/DL (ref 74–109)
HCT VFR BLD CALC: 34.8 % (ref 34–48)
HEMOGLOBIN: 11.2 G/DL (ref 11.5–15.5)
MAGNESIUM: 2.5 MG/DL (ref 1.6–2.6)
MCH RBC QN AUTO: 34.3 PG (ref 26–35)
MCHC RBC AUTO-ENTMCNC: 32.2 % (ref 32–34.5)
MCV RBC AUTO: 106.4 FL (ref 80–99.9)
PDW BLD-RTO: 17.3 FL (ref 11.5–15)
PHENYTOIN DOSE AMOUNT: ABNORMAL
PHENYTOIN LEVEL: 2.4 UG/ML (ref 10–20)
PLATELET # BLD: 132 E9/L (ref 130–450)
PMV BLD AUTO: 10.3 FL (ref 7–12)
POTASSIUM SERPL-SCNC: 4.2 MMOL/L (ref 3.5–5)
RBC # BLD: 3.27 E12/L (ref 3.5–5.5)
SODIUM BLD-SCNC: 140 MMOL/L (ref 132–146)
WBC # BLD: 4.8 E9/L (ref 4.5–11.5)

## 2018-07-26 PROCEDURE — G8978 MOBILITY CURRENT STATUS: HCPCS

## 2018-07-26 PROCEDURE — 80156 ASSAY CARBAMAZEPINE TOTAL: CPT

## 2018-07-26 PROCEDURE — 80185 ASSAY OF PHENYTOIN TOTAL: CPT

## 2018-07-26 PROCEDURE — 2700000000 HC OXYGEN THERAPY PER DAY

## 2018-07-26 PROCEDURE — 80048 BASIC METABOLIC PNL TOTAL CA: CPT

## 2018-07-26 PROCEDURE — 85027 COMPLETE CBC AUTOMATED: CPT

## 2018-07-26 PROCEDURE — 6370000000 HC RX 637 (ALT 250 FOR IP): Performed by: INTERNAL MEDICINE

## 2018-07-26 PROCEDURE — 97530 THERAPEUTIC ACTIVITIES: CPT

## 2018-07-26 PROCEDURE — 2580000003 HC RX 258: Performed by: INTERNAL MEDICINE

## 2018-07-26 PROCEDURE — 2140000000 HC CCU INTERMEDIATE R&B

## 2018-07-26 PROCEDURE — 83735 ASSAY OF MAGNESIUM: CPT

## 2018-07-26 PROCEDURE — 6360000002 HC RX W HCPCS: Performed by: INTERNAL MEDICINE

## 2018-07-26 PROCEDURE — 97161 PT EVAL LOW COMPLEX 20 MIN: CPT

## 2018-07-26 PROCEDURE — G8979 MOBILITY GOAL STATUS: HCPCS

## 2018-07-26 PROCEDURE — 36415 COLL VENOUS BLD VENIPUNCTURE: CPT

## 2018-07-26 RX ORDER — CARVEDILOL 6.25 MG/1
6.25 TABLET ORAL 2 TIMES DAILY WITH MEALS
Status: DISCONTINUED | OUTPATIENT
Start: 2018-07-26 | End: 2018-07-27

## 2018-07-26 RX ADMIN — CARBAMAZEPINE 200 MG: 200 TABLET ORAL at 20:47

## 2018-07-26 RX ADMIN — Medication 10 ML: at 20:47

## 2018-07-26 RX ADMIN — CARBAMAZEPINE 200 MG: 200 TABLET ORAL at 09:42

## 2018-07-26 RX ADMIN — PHENYTOIN SODIUM 100 MG: 100 CAPSULE, EXTENDED RELEASE ORAL at 20:47

## 2018-07-26 RX ADMIN — FUROSEMIDE 40 MG: 10 INJECTION, SOLUTION INTRAMUSCULAR; INTRAVENOUS at 09:43

## 2018-07-26 RX ADMIN — ALLOPURINOL 200 MG: 100 TABLET ORAL at 20:47

## 2018-07-26 RX ADMIN — DIGOXIN 125 MCG: 125 TABLET ORAL at 09:42

## 2018-07-26 RX ADMIN — ASPIRIN 81 MG CHEWABLE TABLET 81 MG: 81 TABLET CHEWABLE at 09:42

## 2018-07-26 RX ADMIN — PHENYTOIN SODIUM 100 MG: 100 CAPSULE, EXTENDED RELEASE ORAL at 09:42

## 2018-07-26 RX ADMIN — Medication 10 ML: at 09:43

## 2018-07-26 ASSESSMENT — PAIN SCALES - GENERAL
PAINLEVEL_OUTOF10: 0
PAINLEVEL_OUTOF10: 0

## 2018-07-26 NOTE — PROGRESS NOTES
admission. **    [] Population Health Clinical Pharmacist was contacted to arrange post-discharge       review of medications. **     [] Other - see below:  1.             Pharmacist: Yoav Giraldo PharmD, [facility]  Date:  7/26/2018 11:16 AM

## 2018-07-26 NOTE — PROGRESS NOTES
Nutrition Education    Type and Reason for Visit: Patient Education    · Verbally reviewed following information with patient. · Written educational materials provided for low sodium, cardiac, fluid restricted diet, 2000 ml/24 hrs. · Contact name and number provided. · Refer to Patient Education activity for more details. Electronically signed by Deepak Perez RD, PRINCESS on 7/26/18 at 12:00 PM    Contact Number:  Ext [phone]  15 min.

## 2018-07-26 NOTE — PLAN OF CARE
Was informed by pharmacy that in order is in for discharge for this patient potentially later today. Would hold off on discharging her as she is in florid heart failure and is dyspneic at rest, which was noted in my consultation from this morning. Feel that she requires diuresis and optimization to euvolemic status with subsequent transition to oral diuretics, preferably torsemide, at the time of discharge.  Dr. Nusrat Olivares

## 2018-07-26 NOTE — PROGRESS NOTES
forward head/forward flexed posture throughout assessment. Patient given repeated verbal cues for hand placement with transfers. Patent demonstrated slower marjorie with narrow KIRA and cues for walker approximation. Patient steps outside the walker on turns despite repeated cues for safety. Patient assisted into the bathroom and required assist with doffing/donning clothing. Patient abandoned [employer] at the sink and while approaching the bed. Multiple cues and education provided for walker safety while referencing falls at home with poor understanding. Patient assisted back to supine with call light and tray table in reach. Patient education  Pt educated on safety with mobility, walker use/approximation, transfers, gait    Patient response to education:   Pt verbalized understanding Pt demonstrated skill Pt requires further education in this area   Yes  Partially with verbal cues and assist Yes      Rehab potential is Good for reaching above PT goals. Pts/ family goals   1. To return home. Patient and or family understand(s) diagnosis, prognosis, and plan of care. Yes     PLAN  PT care will be provided in accordance with the objectives noted above. Whenever appropriate, clear delegation orders will be provided for nursing staff. Exercises and functional mobility practice will be used as well as appropriate assistive devices or modalities to obtain goals. Patient and family education will also be administered as needed. Frequency of treatments will be 2-5x/week x 3-5 days.     Time in: 0845  Time out: [address] [place] [address], PT, DPT   License number:  [id number]
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
## 2018-07-27 PROCEDURE — 2580000003 HC RX 258: Performed by: INTERNAL MEDICINE

## 2018-07-27 PROCEDURE — 6370000000 HC RX 637 (ALT 250 FOR IP): Performed by: INTERNAL MEDICINE

## 2018-07-27 PROCEDURE — 2140000000 HC CCU INTERMEDIATE R&B

## 2018-07-27 PROCEDURE — 6360000002 HC RX W HCPCS: Performed by: INTERNAL MEDICINE

## 2018-07-27 PROCEDURE — 2700000000 HC OXYGEN THERAPY PER DAY

## 2018-07-27 RX ADMIN — CARBAMAZEPINE 200 MG: 200 TABLET ORAL at 09:10

## 2018-07-27 RX ADMIN — ASPIRIN 81 MG CHEWABLE TABLET 81 MG: 81 TABLET CHEWABLE at 09:10

## 2018-07-27 RX ADMIN — PHENYTOIN SODIUM 100 MG: 100 CAPSULE, EXTENDED RELEASE ORAL at 09:10

## 2018-07-27 RX ADMIN — Medication 10 ML: at 22:14

## 2018-07-27 RX ADMIN — CARBAMAZEPINE 200 MG: 200 TABLET ORAL at 22:14

## 2018-07-27 RX ADMIN — ALLOPURINOL 200 MG: 100 TABLET ORAL at 22:14

## 2018-07-27 RX ADMIN — PHENYTOIN SODIUM 100 MG: 100 CAPSULE, EXTENDED RELEASE ORAL at 22:13

## 2018-07-27 RX ADMIN — FUROSEMIDE 40 MG: 10 INJECTION, SOLUTION INTRAMUSCULAR; INTRAVENOUS at 11:25

## 2018-07-27 RX ADMIN — DIGOXIN 125 MCG: 125 TABLET ORAL at 09:10

## 2018-07-27 RX ADMIN — Medication 10 ML: at 09:09

## 2018-07-27 RX ADMIN — FUROSEMIDE 40 MG: 10 INJECTION, SOLUTION INTRAMUSCULAR; INTRAVENOUS at 19:22

## 2018-07-27 ASSESSMENT — PAIN DESCRIPTION - DESCRIPTORS: DESCRIPTORS: SORE

## 2018-07-27 ASSESSMENT — PAIN SCALES - GENERAL
PAINLEVEL_OUTOF10: 3
PAINLEVEL_OUTOF10: 0

## 2018-07-27 ASSESSMENT — PAIN DESCRIPTION - PROGRESSION: CLINICAL_PROGRESSION: NOT CHANGED

## 2018-07-27 ASSESSMENT — PAIN DESCRIPTION - LOCATION: LOCATION: BACK

## 2018-07-27 ASSESSMENT — PAIN DESCRIPTION - FREQUENCY: FREQUENCY: CONTINUOUS

## 2018-07-27 ASSESSMENT — PAIN DESCRIPTION - PAIN TYPE: TYPE: ACUTE PAIN

## 2018-07-27 ASSESSMENT — PAIN DESCRIPTION - ORIENTATION: ORIENTATION: MID

## 2018-07-27 NOTE — CARE COORDINATION
SOCIAL WORK/DISCHARGE PLANNING;  and  met with pt this a.m. to again discuss care transition. Pt again clearly stated that she does not want to go to a facility and her plan is to return home. I explained to her that her friend Lucie Guzman expressed concern about her going home but pt stated that she wants Lucie Guzman to bring her bed down to the first floor so she can stay down stairs. She said she may have mentioned this to Lucie Guzman but pt does not sound like this was discussed. I asked her if I can call Lucie Guzman to let him know what she wants to do and pt gave permission and his contact [phone]. i placed call to Lucie Megan and had to leave a voice mail message. Will await return call. Pt is asking for a BSC for home. Will need physician order. Nursing advised. Will notify [facility] when pt is discharged([phone]). Katey JACKMAN    Order for [facility] obtained. As discussed with pt, placed order with Katherine BERMAN. Faxed referral info and order as requested. Katey JACKMAN     Received call back from pt's friend Lucie Guzman. I explained that pt refuses angelica/snf. He stated that he understands this is pt's decision. Per Lucie Guzman, he will help pt as much as he can at home and he will move her bed downstairs. Lucie Guzman feels he may need until tomorrow to get this done but knows pt may be discharged today. Unit to call Lucieedda Guzman at [phone] when discharged to arrange for him to transport pt home. I told Lucie Guzman that [facility] services have been arranged and that Katherine BERMAN will deliver a BSC to pt's home. Katherine Franco said they will call the house prior to delivery to ensure someone is there. Lucie Guzman verbalized understanding of care transition plan.   Katey JACKMAN

## 2018-07-27 NOTE — PLAN OF CARE
Note that patient is hypotensive at this time. Discontinue carvedilol and restart at 3.125 mg twice daily once euvolemic status achieved.  Dr. Adalgisa Arriaza

## 2018-07-27 NOTE — PROGRESS NOTES
Subjective: The patient is awake and alert. No problems overnight. Denies chest pain, angina, and dyspnea. Denies abdominal pain. Tolerating diet. No nausea or vomiting. Objective:    BP (!) 92/58 Comment: manual  Pulse 80   Temp 98 °F (36.7 °C) (Oral)   Resp 24   Ht 5' 7\" (1.702 m)   Wt 146 lb 3.2 oz (66.3 kg)   SpO2 98%   BMI 22.90 kg/m²     Current medications that patient is taking have been reviewed. Heart:  RRR, no murmurs, gallops, or rubs.   Lungs:  CTA bilaterally, no wheeze, rales or rhonchi  Abd: bowel sounds present, nontender, nondistended, no masses  Extrem:  No clubbing, cyanosis, or edema    CBC with Differential:    Lab Results   Component Value Date    WBC 4.8 07/26/2018    RBC 3.27 07/26/2018    HGB 11.2 07/26/2018    HCT 34.8 07/26/2018     07/26/2018    .4 07/26/2018    MCH 34.3 07/26/2018    MCHC 32.2 07/26/2018    RDW 17.3 07/26/2018    LYMPHOPCT 16.4 07/25/2018    MONOPCT 13.3 07/25/2018    BASOPCT 0.5 07/25/2018    MONOSABS 0.74 07/25/2018    LYMPHSABS 0.91 07/25/2018    EOSABS 0.11 07/25/2018    BASOSABS 0.03 07/25/2018     CMP:    Lab Results   Component Value Date     07/26/2018    K 4.2 07/26/2018    K 3.7 04/05/2018    CL 98 07/26/2018    CO2 28 07/26/2018    BUN 94 07/26/2018    CREATININE 1.5 07/26/2018    GFRAA 41 07/26/2018    LABGLOM 34 07/26/2018    GLUCOSE 85 07/26/2018    PROT 7.0 07/25/2018    LABALBU 3.9 07/25/2018    CALCIUM 8.5 07/26/2018    BILITOT 1.3 07/25/2018    ALKPHOS 186 07/25/2018    AST 37 07/25/2018    ALT 24 07/25/2018     BMP:    Lab Results   Component Value Date     07/26/2018    K 4.2 07/26/2018    K 3.7 04/05/2018    CL 98 07/26/2018    CO2 28 07/26/2018    BUN 94 07/26/2018    LABALBU 3.9 07/25/2018    CREATININE 1.5 07/26/2018    CALCIUM 8.5 07/26/2018    GFRAA 41 07/26/2018    LABGLOM 34 07/26/2018    GLUCOSE 85 07/26/2018     Magnesium:    Lab Results   Component Value Date    MG 2.5 07/26/2018 Phosphorus:  No results found for: PHOS  PT/INR:    Lab Results   Component Value Date    PROTIME 10.5 09/12/2014    INR 1.0 09/12/2014     PTT:  No results found for: APTT, PTT[APTT}     Assessment:    Patient Active Problem List   Diagnosis    S/P ICD (internal cardiac defibrillator) procedure    Biventricular implantable cardioverter-defibrillator in situ    Chronic [facility] fibrillation [facility]    MS (multiple sclerosis) (HCC)    Acute on chronic systolic [facility]congest[facility]       Plan:    Stable. Stable. Rate controlled. Stable. Diurese as BP and renal function allow. Pt/Ot evaluations for discharge planning. Discharge once ok with cardiology. Patient unwilling to go to rehab.       Angie Laura MD  11:40 AM  7/27/2018

## 2018-07-27 NOTE — PLAN OF CARE
Problem: Falls - Risk of:  Goal: Will remain free from falls  Will remain free from falls   Outcome: Met This Shift  Patient remained free of falls during shift.  Cristina Chavez  7/27/2018

## 2018-07-27 NOTE — PROGRESS NOTES
Dr. Bethany Brownlee paged via office to check of hold parameters for lasix d/t BP 92/58 manually. Awaiting call back. Dr. Bethany Brownlee returned call; notified of current BP and states to go ahead and give lasix with current BP. No hold parameters given at this time.

## 2018-07-28 ENCOUNTER — APPOINTMENT (OUTPATIENT)
Dept: GENERAL RADIOLOGY | Age: 78
DRG: 291 | End: 2018-07-28
Payer: MEDICARE

## 2018-07-28 LAB
ALBUMIN SERPL-MCNC: 3.8 G/DL (ref 3.5–5.2)
ALP BLD-CCNC: 175 U/L (ref 35–104)
ALT SERPL-CCNC: 23 U/L (ref 0–32)
AMMONIA: 54 UMOL/L (ref 11–51)
AMORPHOUS: ABNORMAL
ANION GAP SERPL CALCULATED.3IONS-SCNC: 18 MMOL/L (ref 7–16)
AST SERPL-CCNC: 34 U/L (ref 0–31)
B.E.: -3.5 MMOL/L (ref -3–3)
BACTERIA: ABNORMAL /HPF
BILIRUB SERPL-MCNC: 1.4 MG/DL (ref 0–1.2)
BILIRUBIN URINE: ABNORMAL
BLOOD, URINE: ABNORMAL
BUN BLDV-MCNC: 103 MG/DL (ref 8–23)
CALCIUM SERPL-MCNC: 8.7 MG/DL (ref 8.6–10.2)
CARBAMAZEPINE DOSE: ABNORMAL
CARBAMAZEPINE LEVEL: 14.1 MCG/ML (ref 4–10)
CHLORIDE BLD-SCNC: 94 MMOL/L (ref 98–107)
CHLORIDE URINE RANDOM: <20 MMOL/L
CLARITY: CLEAR
CO2: 22 MMOL/L (ref 22–29)
COHB: 0.9 % (ref 0–1.5)
COLOR: YELLOW
CREAT SERPL-MCNC: 1.6 MG/DL (ref 0.5–1)
CREATININE URINE: 98 MG/DL (ref 29–226)
CRITICAL: ABNORMAL
DATE ANALYZED: ABNORMAL
DATE OF COLLECTION: ABNORMAL
DIGOXIN LEVEL: 1.9 NG/ML (ref 0.8–2)
FOLATE: >20 NG/ML (ref 4.8–24.2)
GFR AFRICAN AMERICAN: 38
GFR NON-AFRICAN AMERICAN: 31 ML/MIN/1.73
GLUCOSE BLD-MCNC: 126 MG/DL (ref 74–109)
GLUCOSE URINE: NEGATIVE MG/DL
HCO3: 21 MMOL/L (ref 22–26)
HCT VFR BLD CALC: 34.9 % (ref 34–48)
HEMOGLOBIN: 11.7 G/DL (ref 11.5–15.5)
HHB: 2.7 % (ref 0–5)
KETONES, URINE: NEGATIVE MG/DL
LAB: 9558
LACTIC ACID: 1.6 MMOL/L (ref 0.5–2.2)
LEUKOCYTE ESTERASE, URINE: ABNORMAL
LIPASE: 63 U/L (ref 13–60)
Lab: 1128
MAGNESIUM: 2.7 MG/DL (ref 1.6–2.6)
MCH RBC QN AUTO: 35.2 PG (ref 26–35)
MCHC RBC AUTO-ENTMCNC: 33.5 % (ref 32–34.5)
MCV RBC AUTO: 105.1 FL (ref 80–99.9)
METER GLUCOSE: 127 MG/DL (ref 70–110)
METHB: 0.1 % (ref 0–1.5)
MODE: ABNORMAL
NITRITE, URINE: NEGATIVE
O2 CONTENT: 17.4 ML/DL
O2 SATURATION: 97.3 % (ref 92–98.5)
O2HB: 96.3 % (ref 94–97)
OPERATOR ID: 2860
PATIENT TEMP: 37 C
PCO2: 36.1 MMHG (ref 35–45)
PDW BLD-RTO: 17.2 FL (ref 11.5–15)
PH BLOOD GAS: 7.38 (ref 7.35–7.45)
PH UA: 6.5 (ref 5–9)
PHENYTOIN DOSE AMOUNT: ABNORMAL
PHENYTOIN LEVEL: 5.3 UG/ML (ref 10–20)
PHOSPHORUS: 5.2 MG/DL (ref 2.5–4.5)
PLATELET # BLD: 132 E9/L (ref 130–450)
PMV BLD AUTO: 11.3 FL (ref 7–12)
PO2: 95.4 MMHG (ref 60–100)
POTASSIUM SERPL-SCNC: 5.08 MMOL/L (ref 3.3–5.1)
POTASSIUM SERPL-SCNC: 5.1 MMOL/L (ref 3.5–5)
POTASSIUM, UR: 47.4 MMOL/L
PRO-BNP: ABNORMAL PG/ML (ref 0–450)
PROCALCITONIN: 0.22 NG/ML (ref 0–0.08)
PROTEIN UA: 30 MG/DL
RBC # BLD: 3.32 E12/L (ref 3.5–5.5)
RBC UA: ABNORMAL /HPF (ref 0–2)
SODIUM BLD-SCNC: 134 MMOL/L (ref 132–146)
SODIUM URINE: <20 MMOL/L
SOURCE, BLOOD GAS: ABNORMAL
SPECIFIC GRAVITY UA: 1.01 (ref 1–1.03)
THB: 12.8 G/DL (ref 11.5–16.5)
TIME ANALYZED: 1131
TOTAL PROTEIN: 7.2 G/DL (ref 6.4–8.3)
TROPONIN: 0.14 NG/ML (ref 0–0.03)
UREA NITROGEN, UR: 487 MG/DL (ref 800–1666)
UROBILINOGEN, URINE: 1 E.U./DL
VITAMIN B-12: >2000 PG/ML (ref 211–946)
WBC # BLD: 5.2 E9/L (ref 4.5–11.5)
WBC UA: ABNORMAL /HPF (ref 0–5)

## 2018-07-28 PROCEDURE — 2700000000 HC OXYGEN THERAPY PER DAY

## 2018-07-28 PROCEDURE — 84145 PROCALCITONIN (PCT): CPT

## 2018-07-28 PROCEDURE — 82570 ASSAY OF URINE CREATININE: CPT

## 2018-07-28 PROCEDURE — 83880 ASSAY OF NATRIURETIC PEPTIDE: CPT

## 2018-07-28 PROCEDURE — 84540 ASSAY OF URINE/UREA-N: CPT

## 2018-07-28 PROCEDURE — 2000000000 HC ICU R&B

## 2018-07-28 PROCEDURE — 80156 ASSAY CARBAMAZEPINE TOTAL: CPT

## 2018-07-28 PROCEDURE — 71045 X-RAY EXAM CHEST 1 VIEW: CPT

## 2018-07-28 PROCEDURE — 85027 COMPLETE CBC AUTOMATED: CPT

## 2018-07-28 PROCEDURE — 80053 COMPREHEN METABOLIC PANEL: CPT

## 2018-07-28 PROCEDURE — 81001 URINALYSIS AUTO W/SCOPE: CPT

## 2018-07-28 PROCEDURE — 93005 ELECTROCARDIOGRAM TRACING: CPT | Performed by: INTERNAL MEDICINE

## 2018-07-28 PROCEDURE — 87040 BLOOD CULTURE FOR BACTERIA: CPT

## 2018-07-28 PROCEDURE — 83690 ASSAY OF LIPASE: CPT

## 2018-07-28 PROCEDURE — 80185 ASSAY OF PHENYTOIN TOTAL: CPT

## 2018-07-28 PROCEDURE — 84133 ASSAY OF URINE POTASSIUM: CPT

## 2018-07-28 PROCEDURE — 82607 VITAMIN B-12: CPT

## 2018-07-28 PROCEDURE — 84300 ASSAY OF URINE SODIUM: CPT

## 2018-07-28 PROCEDURE — 2580000003 HC RX 258: Performed by: STUDENT IN AN ORGANIZED HEALTH CARE EDUCATION/TRAINING PROGRAM

## 2018-07-28 PROCEDURE — 83605 ASSAY OF LACTIC ACID: CPT

## 2018-07-28 PROCEDURE — 83735 ASSAY OF MAGNESIUM: CPT

## 2018-07-28 PROCEDURE — 87081 CULTURE SCREEN ONLY: CPT

## 2018-07-28 PROCEDURE — 84484 ASSAY OF TROPONIN QUANT: CPT

## 2018-07-28 PROCEDURE — 6360000002 HC RX W HCPCS: Performed by: INTERNAL MEDICINE

## 2018-07-28 PROCEDURE — 82805 BLOOD GASES W/O2 SATURATION: CPT

## 2018-07-28 PROCEDURE — 82436 ASSAY OF URINE CHLORIDE: CPT

## 2018-07-28 PROCEDURE — 93005 ELECTROCARDIOGRAM TRACING: CPT | Performed by: STUDENT IN AN ORGANIZED HEALTH CARE EDUCATION/TRAINING PROGRAM

## 2018-07-28 PROCEDURE — 80162 ASSAY OF DIGOXIN TOTAL: CPT

## 2018-07-28 PROCEDURE — 82746 ASSAY OF FOLIC ACID SERUM: CPT

## 2018-07-28 PROCEDURE — 51702 INSERT TEMP BLADDER CATH: CPT

## 2018-07-28 PROCEDURE — 87205 SMEAR GRAM STAIN: CPT

## 2018-07-28 PROCEDURE — 94760 N-INVAS EAR/PLS OXIMETRY 1: CPT

## 2018-07-28 PROCEDURE — 84132 ASSAY OF SERUM POTASSIUM: CPT

## 2018-07-28 PROCEDURE — 82140 ASSAY OF AMMONIA: CPT

## 2018-07-28 PROCEDURE — 6370000000 HC RX 637 (ALT 250 FOR IP): Performed by: INTERNAL MEDICINE

## 2018-07-28 PROCEDURE — 84100 ASSAY OF PHOSPHORUS: CPT

## 2018-07-28 PROCEDURE — 99291 CRITICAL CARE FIRST HOUR: CPT | Performed by: INTERNAL MEDICINE

## 2018-07-28 PROCEDURE — 82962 GLUCOSE BLOOD TEST: CPT

## 2018-07-28 PROCEDURE — 2580000003 HC RX 258: Performed by: INTERNAL MEDICINE

## 2018-07-28 PROCEDURE — 36415 COLL VENOUS BLD VENIPUNCTURE: CPT

## 2018-07-28 RX ORDER — SODIUM CHLORIDE 0.9 % (FLUSH) 0.9 %
10 SYRINGE (ML) INJECTION EVERY 12 HOURS SCHEDULED
Status: DISCONTINUED | OUTPATIENT
Start: 2018-07-28 | End: 2018-08-01 | Stop reason: HOSPADM

## 2018-07-28 RX ORDER — TORSEMIDE 20 MG/1
40 TABLET ORAL DAILY
Qty: 30 TABLET | Refills: 3 | Status: SHIPPED | OUTPATIENT
Start: 2018-07-28

## 2018-07-28 RX ORDER — ONDANSETRON 2 MG/ML
4 INJECTION INTRAMUSCULAR; INTRAVENOUS EVERY 6 HOURS PRN
Status: DISCONTINUED | OUTPATIENT
Start: 2018-07-28 | End: 2018-07-28

## 2018-07-28 RX ORDER — SODIUM CHLORIDE 0.9 % (FLUSH) 0.9 %
10 SYRINGE (ML) INJECTION PRN
Status: DISCONTINUED | OUTPATIENT
Start: 2018-07-28 | End: 2018-08-01 | Stop reason: HOSPADM

## 2018-07-28 RX ORDER — LACTULOSE 10 G/15ML
20 SOLUTION ORAL 3 TIMES DAILY
Status: DISCONTINUED | OUTPATIENT
Start: 2018-07-28 | End: 2018-07-29

## 2018-07-28 RX ORDER — HEPARIN SODIUM 10000 [USP'U]/ML
5000 INJECTION, SOLUTION INTRAVENOUS; SUBCUTANEOUS EVERY 8 HOURS SCHEDULED
Status: DISCONTINUED | OUTPATIENT
Start: 2018-07-29 | End: 2018-08-01 | Stop reason: HOSPADM

## 2018-07-28 RX ORDER — SODIUM CHLORIDE 9 MG/ML
INJECTION, SOLUTION INTRAVENOUS CONTINUOUS
Status: DISCONTINUED | OUTPATIENT
Start: 2018-07-28 | End: 2018-07-29

## 2018-07-28 RX ADMIN — PHENYTOIN SODIUM 100 MG: 100 CAPSULE, EXTENDED RELEASE ORAL at 08:42

## 2018-07-28 RX ADMIN — CARBAMAZEPINE 200 MG: 200 TABLET ORAL at 08:42

## 2018-07-28 RX ADMIN — SODIUM CHLORIDE: 9 INJECTION, SOLUTION INTRAVENOUS at 14:58

## 2018-07-28 RX ADMIN — FUROSEMIDE 40 MG: 10 INJECTION, SOLUTION INTRAMUSCULAR; INTRAVENOUS at 08:42

## 2018-07-28 RX ADMIN — Medication 10 ML: at 08:42

## 2018-07-28 RX ADMIN — ASPIRIN 81 MG CHEWABLE TABLET 81 MG: 81 TABLET CHEWABLE at 08:42

## 2018-07-28 RX ADMIN — DIGOXIN 125 MCG: 125 TABLET ORAL at 08:42

## 2018-07-28 ASSESSMENT — PAIN SCALES - GENERAL
PAINLEVEL_OUTOF10: 0

## 2018-07-28 NOTE — PROGRESS NOTES
2 times per day    enoxaparin  40 mg Subcutaneous Daily     Continuous Infusions:  PRN Meds:.sodium chloride flush, ondansetron, acetaminophen    Allergies: Nsaids      Labs: Recent Labs      07/26/18   0505   WBC  4.8   HGB  11.2*   HCT  34.8   MCV  106.4*   PLT  132       Labs: Recent Labs      07/26/18   0505   NA  140   K  4.2   CL  98   CO2  28   BUN  94*   CREATININE  1.5*       Labs: Recent Labs      07/25/18   1606  07/25/18   1955   TROPONINI  0.18*  0.16*       Labs: No results for input(s): BNP in the last 72 hours. Labs: No results for input(s): CHOL, HDL, TRIG in the last 72 hours. Invalid input(s): CHOLHDLR, LDLCALCU    Labs: No results for input(s): PROT, INR in the last 72 hours. Review of systems: No reported significant weight gain or weight loss. no dysuria or frequency, no dizziness, falls or trauma, no change in bowel or bladder habits, no hematochezia, hemoptysis or hematuria. No fevers, chills, nausea or vomiting reported. No significant wheezing or sputum production. No headache or visual changes. The remainder of the 10 review of systems otherwise negative. Exam      General: Patient comfortable in no distress and currently denies any chest pain. HEENT: Face symmetrical and no apparent cranial nerve deficit. Neck: No jugular venous distention, carotid bruit or thyromegaly. Lungs: Clear bilaterally without rales, wheezes or dullness. Cardiac: Regular rate and rhythm, no S3, S4, no rub or gallop. Abdomen: No rebound or guarding, no hepatosplenomegaly. Extremities: Without significant clubbing , cyanosis, or edema. Neuro:  No focal motor or sensory deficit apparent. Skin: No petechiae, no significant bruising. Assessment: See plan below        Plan: #1 acute on chronic systolic congestive heart failure and continuing on digoxin 0.125 mg daily and Lasix 40 mg IV twice a day. Blood pressure systolic 90 to 302 range.   At home was

## 2018-07-29 ENCOUNTER — APPOINTMENT (OUTPATIENT)
Dept: GENERAL RADIOLOGY | Age: 78
DRG: 291 | End: 2018-07-29
Payer: MEDICARE

## 2018-07-29 ENCOUNTER — APPOINTMENT (OUTPATIENT)
Dept: ULTRASOUND IMAGING | Age: 78
DRG: 291 | End: 2018-07-29
Payer: MEDICARE

## 2018-07-29 PROBLEM — G93.41 METABOLIC ENCEPHALOPATHY: Status: ACTIVE | Noted: 2018-07-29

## 2018-07-29 PROBLEM — N17.9 ACUTE KIDNEY INJURY (HCC): Status: ACTIVE | Noted: 2018-07-29

## 2018-07-29 LAB
ALBUMIN SERPL-MCNC: 3.9 G/DL (ref 3.5–5.2)
ALP BLD-CCNC: 174 U/L (ref 35–104)
ALT SERPL-CCNC: 26 U/L (ref 0–32)
ANION GAP SERPL CALCULATED.3IONS-SCNC: 21 MMOL/L (ref 7–16)
APPEARANCE FLUID: NORMAL
AST SERPL-CCNC: 42 U/L (ref 0–31)
BASOPHILS ABSOLUTE: 0.01 E9/L (ref 0–0.2)
BASOPHILS RELATIVE PERCENT: 0.2 % (ref 0–2)
BILIRUB SERPL-MCNC: 1.5 MG/DL (ref 0–1.2)
BUN BLDV-MCNC: 108 MG/DL (ref 8–23)
CALCIUM SERPL-MCNC: 8.6 MG/DL (ref 8.6–10.2)
CELL COUNT FLUID TYPE: NORMAL
CHLORIDE BLD-SCNC: 99 MMOL/L (ref 98–107)
CO2: 19 MMOL/L (ref 22–29)
COLOR FLUID: YELLOW
CREAT SERPL-MCNC: 1.8 MG/DL (ref 0.5–1)
EOSINOPHILS ABSOLUTE: 0 E9/L (ref 0.05–0.5)
EOSINOPHILS RELATIVE PERCENT: 0 % (ref 0–6)
FLUID TYPE: NORMAL
FLUID TYPE: NORMAL
GFR AFRICAN AMERICAN: 33
GFR NON-AFRICAN AMERICAN: 27 ML/MIN/1.73
GLUCOSE BLD-MCNC: 118 MG/DL (ref 74–109)
GLUCOSE, FLUID: 106 MG/DL
HCT VFR BLD CALC: 36.1 % (ref 34–48)
HEMOGLOBIN: 12.1 G/DL (ref 11.5–15.5)
IMMATURE GRANULOCYTES #: 0.03 E9/L
IMMATURE GRANULOCYTES %: 0.6 % (ref 0–5)
LD, FLUID: 192 U/L
LV EF: 20 %
LVEF MODALITY: NORMAL
LYMPHOCYTES ABSOLUTE: 0.85 E9/L (ref 1.5–4)
LYMPHOCYTES RELATIVE PERCENT: 17.3 % (ref 20–42)
MAGNESIUM: 2.8 MG/DL (ref 1.6–2.6)
MCH RBC QN AUTO: 35 PG (ref 26–35)
MCHC RBC AUTO-ENTMCNC: 33.5 % (ref 32–34.5)
MCV RBC AUTO: 104.3 FL (ref 80–99.9)
MONOCYTE, FLUID: 64 %
MONOCYTES ABSOLUTE: 0.54 E9/L (ref 0.1–0.95)
MONOCYTES RELATIVE PERCENT: 11 % (ref 2–12)
NEUTROPHIL, FLUID: 36 %
NEUTROPHILS ABSOLUTE: 3.48 E9/L (ref 1.8–7.3)
NEUTROPHILS RELATIVE PERCENT: 70.9 % (ref 43–80)
NUCLEATED CELLS FLUID: 369 /UL
PDW BLD-RTO: 17.2 FL (ref 11.5–15)
PH, BODY FLUID: 7.4
PLATELET # BLD: 152 E9/L (ref 130–450)
PMV BLD AUTO: 11.1 FL (ref 7–12)
POTASSIUM REFLEX MAGNESIUM: 5.4 MMOL/L (ref 3.5–5)
PROTEIN FLUID: 2.7 G/DL
RBC # BLD: 3.46 E12/L (ref 3.5–5.5)
RBC FLUID: 4000 /UL
SODIUM BLD-SCNC: 139 MMOL/L (ref 132–146)
TOTAL PROTEIN: 6.8 G/DL (ref 6.4–8.3)
TROPONIN: 0.13 NG/ML (ref 0–0.03)
WBC # BLD: 4.9 E9/L (ref 4.5–11.5)

## 2018-07-29 PROCEDURE — 6370000000 HC RX 637 (ALT 250 FOR IP): Performed by: INTERNAL MEDICINE

## 2018-07-29 PROCEDURE — 84484 ASSAY OF TROPONIN QUANT: CPT

## 2018-07-29 PROCEDURE — 6360000002 HC RX W HCPCS: Performed by: STUDENT IN AN ORGANIZED HEALTH CARE EDUCATION/TRAINING PROGRAM

## 2018-07-29 PROCEDURE — 6360000002 HC RX W HCPCS: Performed by: INTERNAL MEDICINE

## 2018-07-29 PROCEDURE — 83615 LACTATE (LD) (LDH) ENZYME: CPT

## 2018-07-29 PROCEDURE — 2500000003 HC RX 250 WO HCPCS: Performed by: INTERNAL MEDICINE

## 2018-07-29 PROCEDURE — 94640 AIRWAY INHALATION TREATMENT: CPT

## 2018-07-29 PROCEDURE — 6370000000 HC RX 637 (ALT 250 FOR IP): Performed by: STUDENT IN AN ORGANIZED HEALTH CARE EDUCATION/TRAINING PROGRAM

## 2018-07-29 PROCEDURE — 71045 X-RAY EXAM CHEST 1 VIEW: CPT

## 2018-07-29 PROCEDURE — 32555 ASPIRATE PLEURA W/ IMAGING: CPT | Performed by: INTERNAL MEDICINE

## 2018-07-29 PROCEDURE — 93306 TTE W/DOPPLER COMPLETE: CPT

## 2018-07-29 PROCEDURE — 80053 COMPREHEN METABOLIC PANEL: CPT

## 2018-07-29 PROCEDURE — 85025 COMPLETE CBC W/AUTO DIFF WBC: CPT

## 2018-07-29 PROCEDURE — 87205 SMEAR GRAM STAIN: CPT

## 2018-07-29 PROCEDURE — 76705 ECHO EXAM OF ABDOMEN: CPT

## 2018-07-29 PROCEDURE — 88112 CYTOPATH CELL ENHANCE TECH: CPT

## 2018-07-29 PROCEDURE — 94664 DEMO&/EVAL PT USE INHALER: CPT

## 2018-07-29 PROCEDURE — 0W993ZZ DRAINAGE OF RIGHT PLEURAL CAVITY, PERCUTANEOUS APPROACH: ICD-10-PCS | Performed by: INTERNAL MEDICINE

## 2018-07-29 PROCEDURE — 32554 ASPIRATE PLEURA W/O IMAGING: CPT

## 2018-07-29 PROCEDURE — 2700000000 HC OXYGEN THERAPY PER DAY

## 2018-07-29 PROCEDURE — 82947 ASSAY GLUCOSE BLOOD QUANT: CPT

## 2018-07-29 PROCEDURE — 83735 ASSAY OF MAGNESIUM: CPT

## 2018-07-29 PROCEDURE — 89051 BODY FLUID CELL COUNT: CPT

## 2018-07-29 PROCEDURE — 2000000000 HC ICU R&B

## 2018-07-29 PROCEDURE — 84157 ASSAY OF PROTEIN OTHER: CPT

## 2018-07-29 PROCEDURE — 87102 FUNGUS ISOLATION CULTURE: CPT

## 2018-07-29 PROCEDURE — 83986 ASSAY PH BODY FLUID NOS: CPT

## 2018-07-29 PROCEDURE — 2580000003 HC RX 258: Performed by: STUDENT IN AN ORGANIZED HEALTH CARE EDUCATION/TRAINING PROGRAM

## 2018-07-29 PROCEDURE — 87070 CULTURE OTHR SPECIMN AEROBIC: CPT

## 2018-07-29 PROCEDURE — 36415 COLL VENOUS BLD VENIPUNCTURE: CPT

## 2018-07-29 PROCEDURE — S0028 INJECTION, FAMOTIDINE, 20 MG: HCPCS | Performed by: INTERNAL MEDICINE

## 2018-07-29 PROCEDURE — 88305 TISSUE EXAM BY PATHOLOGIST: CPT

## 2018-07-29 PROCEDURE — P9046 ALBUMIN (HUMAN), 25%, 20 ML: HCPCS | Performed by: INTERNAL MEDICINE

## 2018-07-29 RX ORDER — FUROSEMIDE 10 MG/ML
20 INJECTION INTRAMUSCULAR; INTRAVENOUS ONCE
Status: DISCONTINUED | OUTPATIENT
Start: 2018-07-29 | End: 2018-07-29

## 2018-07-29 RX ORDER — BUMETANIDE 0.25 MG/ML
1 INJECTION, SOLUTION INTRAMUSCULAR; INTRAVENOUS ONCE
Status: COMPLETED | OUTPATIENT
Start: 2018-07-29 | End: 2018-07-29

## 2018-07-29 RX ORDER — ALBUMIN (HUMAN) 12.5 G/50ML
25 SOLUTION INTRAVENOUS ONCE
Status: COMPLETED | OUTPATIENT
Start: 2018-07-29 | End: 2018-07-29

## 2018-07-29 RX ORDER — SODIUM POLYSTYRENE SULFONATE 15 G/60ML
15 SUSPENSION ORAL; RECTAL ONCE
Status: COMPLETED | OUTPATIENT
Start: 2018-07-29 | End: 2018-07-29

## 2018-07-29 RX ORDER — PANTOPRAZOLE SODIUM 40 MG/1
40 TABLET, DELAYED RELEASE ORAL
Status: DISCONTINUED | OUTPATIENT
Start: 2018-07-30 | End: 2018-08-01 | Stop reason: HOSPADM

## 2018-07-29 RX ORDER — CARVEDILOL 6.25 MG/1
12.5 TABLET ORAL 2 TIMES DAILY WITH MEALS
Status: DISCONTINUED | OUTPATIENT
Start: 2018-07-29 | End: 2018-08-01 | Stop reason: HOSPADM

## 2018-07-29 RX ORDER — PHENYTOIN SODIUM 100 MG/1
100 CAPSULE, EXTENDED RELEASE ORAL 2 TIMES DAILY
Status: DISCONTINUED | OUTPATIENT
Start: 2018-07-29 | End: 2018-07-29

## 2018-07-29 RX ORDER — BUMETANIDE 0.25 MG/ML
1 INJECTION, SOLUTION INTRAMUSCULAR; INTRAVENOUS DAILY
Status: DISCONTINUED | OUTPATIENT
Start: 2018-07-30 | End: 2018-07-30

## 2018-07-29 RX ORDER — DEXTROSE AND SODIUM CHLORIDE 5; .9 G/100ML; G/100ML
INJECTION, SOLUTION INTRAVENOUS CONTINUOUS
Status: DISCONTINUED | OUTPATIENT
Start: 2018-07-29 | End: 2018-07-29

## 2018-07-29 RX ORDER — LIDOCAINE HYDROCHLORIDE 10 MG/ML
INJECTION, SOLUTION INFILTRATION; PERINEURAL
Status: DISPENSED
Start: 2018-07-29 | End: 2018-07-30

## 2018-07-29 RX ORDER — CARBAMAZEPINE 200 MG/1
200 TABLET ORAL 2 TIMES DAILY
Status: DISCONTINUED | OUTPATIENT
Start: 2018-07-29 | End: 2018-07-29

## 2018-07-29 RX ADMIN — Medication 0.5 MG: at 11:37

## 2018-07-29 RX ADMIN — Medication 10 ML: at 09:40

## 2018-07-29 RX ADMIN — BUMETANIDE 1 MG: 0.25 INJECTION INTRAMUSCULAR; INTRAVENOUS at 09:40

## 2018-07-29 RX ADMIN — ALBUMIN (HUMAN) 25 G: 0.25 INJECTION, SOLUTION INTRAVENOUS at 09:40

## 2018-07-29 RX ADMIN — Medication 10 ML: at 01:34

## 2018-07-29 RX ADMIN — LACTULOSE 20 G: 20 SOLUTION ORAL at 01:34

## 2018-07-29 RX ADMIN — ASPIRIN 81 MG CHEWABLE TABLET 81 MG: 81 TABLET CHEWABLE at 09:37

## 2018-07-29 RX ADMIN — FAMOTIDINE 20 MG: 10 INJECTION, SOLUTION INTRAVENOUS at 09:37

## 2018-07-29 RX ADMIN — HEPARIN SODIUM 5000 UNITS: 10000 INJECTION INTRAVENOUS; SUBCUTANEOUS at 14:56

## 2018-07-29 RX ADMIN — DIGOXIN 125 MCG: 125 TABLET ORAL at 09:36

## 2018-07-29 RX ADMIN — SODIUM POLYSTYRENE SULFONATE 15 G: 15 SUSPENSION ORAL; RECTAL at 09:41

## 2018-07-29 RX ADMIN — CARVEDILOL 12.5 MG: 6.25 TABLET, FILM COATED ORAL at 09:37

## 2018-07-29 RX ADMIN — WATER 1 G: 1 INJECTION INTRAMUSCULAR; INTRAVENOUS; SUBCUTANEOUS at 01:34

## 2018-07-29 RX ADMIN — Medication 0.5 MG: at 15:33

## 2018-07-29 RX ADMIN — Medication 0.5 MG: at 19:57

## 2018-07-29 ASSESSMENT — PAIN SCALES - GENERAL
PAINLEVEL_OUTOF10: 0

## 2018-07-29 NOTE — PROGRESS NOTES
[facility] Quality Flow/Interdisciplinary Rounds Progress Note        Quality Flow Rounds held on July 29, 2018    Disciplines Attending:  Bedside Nurse, Charge nurse, GEOFFREY, OT, PT, , and . Jelani Gray was admitted on 7/25/2018  6:11 AM    Anticipated Discharge Date:  Expected Discharge Date: 07/27/18    Disposition:    Sukhdev Score:  Sukhdev Scale Score: 17    Readmission Risk              Risk of Unplanned Readmission:        17             Discussed patient goal for the day, patient clinical progression, and barriers to discharge. The following Goal(s) of the Day/Commitment(s) have been identified:  Monitor mental status, monitor labs and vital signs.  Possible transfer      Clementine Reese  July 29, 2018

## 2018-07-29 NOTE — PROGRESS NOTES
Value Date    PHOS 5.2 07/28/2018     PT/INR:    Lab Results   Component Value Date    PROTIME 10.5 09/12/2014    INR 1.0 09/12/2014     PTT:  No results found for: APTT, PTT[APTT}     Assessment:    Patient Active Problem List   Diagnosis    S/P ICD (internal cardiac defibrillator) procedure    Biventricular implantable cardioverter-defibrillator in situ    Chronic atrial fibrillation (HCC)    MS (multiple sclerosis) (HCC)    Acute on chronic systolic CHF (congestive heart failure) ([facility]    Acute kidney injury ([facility]    Metabolic encephalopathy       Plan:    Stable. Stable. Rate controlled. Stable. Diurese as BP and renal function allow. Elevation of BUN/Cr secondary to intravascular volume depletion from over diuresis. Hold diuretic. Gently hydrate. Secondary to above. Pt/Ot evaluations for discharge planning. Patient unwilling to go to rehab.       Claudeen Basta MD  11:01 AM  7/29/2018

## 2018-07-29 NOTE — PROCEDURES
Thoracentesis Procedure Note    PATIENT:  [id number]  # [id number]     DATE:  7/29/2018    INDICTATIONS: Smiley Gunderson 70 y.o. female with pleural effusion found on imaging. PRE - OPERATIVE DIAGNOSIS:  R Pleural Effusion    POST - OPERATIVE DIAGNOSIS:  Same    PERFORMED By:  Max Montes MD    ASSISTANT(S):  US     CONSCENT:  Verbal consent obtained. Written consent obtained. After informed consent & appropriate time out protocol was noted & obtained. Risks/Benefits/Alternatives of the procedure were discussed including: infection, bleeding, pain, & pneumothorax. THORACENTESIS PROCEDURE DETAILS:  Patient understanding: patient states understanding of the procedure being performed. Time out: Immediately prior to procedure a \"time out\" was called to verify the correct. Patient was placed in a sitting position and ultrasound was done and site of maximum fluid collection was marked. PREPARATION: Patient was prepped and draped in the usual sterile fashion. ANESTHESIA: Lidocaine 1% without epinephrine, amount varied for local control. PROCEDURE NOTE: The patient was placed in the sitting position. US was then used to mellissa the fluid and depth was determined. Then the skin was prepped with Chloraprep solution and draped with sterile covers. For the procedure we used 1% buffered lidocaine to anesthetize the skin, subcutaneous tissue and parietal pleura. After adequate anesthesia was accomplished. The plural catheter was advanced over a guide into the pleural space. The fluid was obtained without any difficulties and minimal blood loss. A total of 1200 c fluid was removed. The fluid was seen below in color. A dressing was applied to the incision area. FINDINGS/SAMPLES: We removed 1200 ml of above pleural fluid. The samples was sent for analysis. COMPLICATIONS:  None; patient tolerated the procedure well.     PLAN: Care will be taken to review the post procedure

## 2018-07-29 NOTE — PROGRESS NOTES
==  Medical Intensive Care Unit     Elizabeth Pedroedkalani Butt6  Resident History and Physical    Date and time: 2018 11:20 AM  Patient's name:  Christy Solano  Medical Record Number: [id number]  Patient's account/billing number: [de-identified]  Patient's YOB: 1940  Age: 70 y.o. Date of Admission: 2018  6:11 AM  Length of stay during current admission: 4    Primary Care Physician: Harry Dos Santos MD  ICU Attending Physician: Dr. Fabrizio Kent    Code Status: Full Code    Reason for ICU admission: AMS  CC follow up of confusion ,SOB   Subjective     The patient was seen and examined today. Mentation has become much better. Denies worsening SOB. Tolerating diet well.      CURRENT VENTILATION STATUS:   [] Ventilator  [] BIPAP  [x] Nasal Cannula [] Room Air      IF INTUBATED, ET TUBE MARKING AT LOWER LIP:       cms    SECRETIONS   Amount:  [] Small [] Moderate  [] Large [x] None    Color:     [] White [] Colored  [] Bloody    SEDATION:  RAAS Score:  [] Propofol gtt  [] Versed gtt  [] Ativan gtt   [x] No Sedation    PARALYZED:  [x] No    [] Yes    VASOPRESSORS:  [x] No    [] Yes    If yes -   [] Levophed       [] Dopamine     [] Vasopressin       [] Dobutamine  [] Phenylephrine         [] Epinephrine    CENTRAL LINES:     [x] No   [] Yes              If yes -     [] Right IJ     [] Left IJ [] Right Femoral [] Left Femoral                   [] Right Subclavian [] Left Subclavian     DOWNEY'S CATHETER:   [] No   [x] Yes  (Date of Insertion:   )     URINE OUTPUT:            [x] Good   [] Low              [] Anuric    OBJECTIVE:     VITAL SIGNS:  BP (!) 82/52   Pulse 75   Temp 96.8 °F (36 °C) (Temporal)   Resp 24   Ht 5' 7\" (1.702 m)   Wt 149 lb (67.6 kg)   SpO2 90%   BMI 23.34 kg/m²   Tmax over 24 hours:  Temp (24hrs), Av.6 °F (36.4 °C), Min:96.8 °F (36 °C), Max:98.7 °F (37.1 °C)      Patient Vitals for the past 6 hrs:   BP Temp Temp src Pulse Resp SpO2 Weight   07/29/18 1100 (!) 82/52 - - 75 24 90 % -   07/29/18 1000 101/67 - - 75 22 100 % -   07/29/18 0900 100/70 - - 79 23 98 % -   07/29/18 0800 (!) 91/59 96.8 °F (36 °C) Temporal 84 23 96 % -   07/29/18 0700 96/62 - - 96 (!) 34 90 % -   07/29/18 0600 109/72 - - 102 28 92 % 149 lb (67.6 kg)         Intake/Output Summary (Last 24 hours) at 07/29/18 1120  Last data filed at 07/29/18 0700   Gross per 24 hour   Intake            814.7 ml   Output              209 ml   Net            605.7 ml     Wt Readings from Last 2 Encounters:   07/29/18 149 lb (67.6 kg)   07/24/18 154 lb 11.2 oz (70.2 kg)     Body mass index is 23.34 kg/m².       PHYSICAL EXAMINATION:  General appearance - oriented to person, place, and time  Mental status - alert, oriented to person, place, and time  Eyes - pupils equal and reactive, extraocular eye movements intact  Ears - bilateral TM's and external ear canals normal  Nose - normal and patent, no erythema, discharge or polyps  Mouth - mucous membranes moist, pharynx normal without lesions  Neck - supple, no significant adenopathy  Chest - rales noted bibasilar   Heart - normal rate, regular rhythm, normal S1, S2, no murmurs, rubs, clicks or gallops, S1 and S2 normal  Abdomen - soft, nontender, nondistended, no masses or organomegaly  Neurological - alert, oriented, normal speech, no focal findings or movement disorder noted}  Extremities - peripheral pulses normal, no pedal edema, no clubbing or cyanosis, pedal edema 4 (+)   Skin - normal coloration and turgor, no rashes, no suspicious skin lesions noted      Any additional physical findings:    MEDICATIONS:  Scheduled Meds:   cefTRIAXone (ROCEPHIN) IV  1 g Intravenous Q24H    ipratropium  0.5 mg Nebulization 4x daily    carvedilol  12.5 mg Oral BID WC    [START ON 7/30/2018] pantoprazole  40 mg Oral QAM AC    [START ON 7/30/2018] bumetanide  1 mg Intravenous Daily    sodium chloride flush  10 mL Intravenous 2 times per day    heparin (porcine)  5,000 Units Subcutaneous 3

## 2018-07-29 NOTE — PROGRESS NOTES
505.7 ml       Wt Readings from Last 3 Encounters:   07/29/18 149 lb (67.6 kg)   07/24/18 154 lb 11.2 oz (70.2 kg)   05/10/18 135 lb (61.2 kg)       Telemetry: I personally reviewed and shows ventricular paced rhythm. Current meds: Scheduled Meds:   cefTRIAXone (ROCEPHIN) IV  1 g Intravenous Q24H    ipratropium  0.5 mg Nebulization 4x daily    carvedilol  12.5 mg Oral BID WC    [START ON 7/30/2018] pantoprazole  40 mg Oral QAM AC    [START ON 7/30/2018] bumetanide  1 mg Intravenous Daily    sodium chloride flush  10 mL Intravenous 2 times per day    heparin (porcine)  5,000 Units Subcutaneous 3 times per day    lactulose  20 g Oral TID    aspirin  81 mg Oral Daily    digoxin  125 mcg Oral Daily     Continuous Infusions:  PRN Meds:.sodium chloride flush, magnesium hydroxide    Allergies: Nsaids      Labs: Recent Labs      07/28/18   1135  07/29/18   0620   WBC  5.2  4.9   HGB  11.7  12.1   HCT  34.9  36.1   MCV  105.1*  104.3*   PLT  132  152       Labs: Recent Labs      07/28/18   1128  07/28/18   1135  07/29/18   0620   NA   --   134  139   K  5.08  5.1*  5.4*   CL   --   94*  99   CO2   --   22  19*   PHOS   --   5.2*   --    BUN   --   103*  108*   CREATININE   --   1.6*  1.8*       Labs: Recent Labs      07/28/18   1630  07/29/18   0620   TROPONINI  0.14*  0.13*       Labs: No results for input(s): BNP in the last 72 hours. Labs: No results for input(s): CHOL, HDL, TRIG in the last 72 hours. Invalid input(s): Sanjiv Medrano    Labs:   Recent Labs      07/28/18   1135  07/29/18   0620   PROT  7.2  6.8       Review of systems: No reported significant weight gain or weight loss. no dysuria or frequency, no dizziness, falls or trauma, no change in bowel or bladder habits, no hematochezia, hemoptysis or hematuria. No fevers, chills, nausea or vomiting reported. No significant wheezing or sputum production. No headache or visual changes.   The remainder of the 10 review of systems otherwise negative. Exam      General: Patient comfortable in no distress and currently denies any chest pain. HEENT: Face symmetrical and no apparent cranial nerve deficit. Neck: No jugular venous distention, carotid bruit or thyromegaly. Lungs: Clear bilaterally without rales, wheezes or dullness. Cardiac: Regular rate and rhythm, no S3, S4, no rub or gallop. Abdomen: No rebound or guarding, no hepatosplenomegaly. Extremities: Without significant clubbing , cyanosis, or edema. Neuro:  No focal motor or sensory deficit apparent. Skin: No petechiae, no significant bruising. Assessment: see plan below        Plan: #1 mental status change without focal motor or sensory deficit. Head CT without distinct abnormality. Question of possible urinary tract infection and currently on ceftriaxone 1 g IV daily. #2 acute on chronic systolic congestive heart failure and medical management and had held carvedilol and Diovan with hypotension. Now back on carvedilol 12.5 mg twice a day as it was reordered. Really not much blood pressure work with and would like to avoid dropping the blood pressure too low which would avoid increased risk of falls or syncope. #3 Gen. debilitation and weakness. She reports she had a significant fall about 2 or 3 months ago and just hasn't been able to recover from that. #4 lower extremity edema is chronic and currently receiving Bumex 1 mg IV daily. Could be transferred back to telemetry floor from cardiology viewpoint.         Electronically signed by Jama Jalloh MD on 7/29/2018 at 11:32 AM

## 2018-07-29 NOTE — CONSULTS
daily 7/28/18  Yes Alis Lockett MD   diazepam (VALIUM) 5 MG tablet Take 5 mg by mouth daily. Alba Irene Historical Provider, MD   digoxin (LANOXIN) 125 MCG tablet Take 1 tablet by mouth daily 7/25/18   Sandra Ramos MD   aspirin 81 MG tablet Take 81 mg by mouth daily Check with Dr. El Thakur - pre-op instructions    Historical Provider, MD   Coenzyme Q10-Fish Oil-Vit E (CO-Q 10 OMEGA-3 FISH OIL PO) Take by mouth Last dose 4/3/18. Historical Provider, MD   NATTOKINASE PO Take by mouth natto plus. Contact Dr El Thakur re:preop instructions    Historical Provider, MD   Multiple Vitamins-Minerals (DAILY HEART HEALTH SUPPORT PO) Take by mouth Multivitamin  LD 4-3-18    Historical Provider, MD   allopurinol (ZYLOPRIM) 100 MG tablet Take 200 mg by mouth nightly. Historical Provider, MD   Travoprost, ERASMO Free, (TRAVATAN Z) 0.004 % SOLN ophthalmic solution Place 1 drop into both eyes nightly. Historical Provider, MD   CarBAMazepine (TEGRETOL PO) Take 200 mg by mouth 2 times daily Take am dos,    Historical Provider, MD   Phenytoin (DILANTIN PO) Take 100 mg by mouth 2 times daily Take am dos,    Historical Provider, MD       Allergies:  Nsaids    Social History:   TOBACCO:   reports that she has never smoked. She has never used smokeless tobacco.  ETOH:   reports that she does not drink alcohol. OCCUPATION:      Family History:   History reviewed. No pertinent family history. REVIEW OF SYSTEMS:  Constitutional: No fever, no chill or change in weight; good appetite  HEENT: No blurred vision, no ear problems, no sore throat, no running nose. Respiratory: No cough, no sputum, no pleuritic chest pain, no shortness of breath  Cardiology: No angina, no dyspnea on exertion, no paroxysmal nocturnal dyspnea, no orthopnea, no palpitation, no leg swelling. Gastroenterology: No dysphagia, no reflux; no abdominal pain, no nausea or vomiting; no constipation or diarrhea. No blood in stool.     Genitourinary: No dysuria, no frequency, hesitancy; no hematuria  Musculoskeletal: no joint pain, no myalgia, no change in range of movement  Neurology: no focal weakness in extremities, no slurred speech, no double vision, no tingling or numbness sensation. Endocrinology: no temperature intolerance, no polyphagia, polydipsia or polyuria  Hematology: no increased bleeding, no bruising, no lymphadenopathy  Skin: no skin change noticed by patient  Psychology: no depressed mood, no suicidal ideation    Physical Exam       Physical Exam:  · Vitals: /64   Pulse 102   Temp 98.2 °F (36.8 °C) (Temporal)   Resp (!) 38   Ht 5' 7\" (1.702 m)   Wt 147 lb (66.7 kg)   SpO2 98%   BMI 23.02 kg/m²     · I & O - 24hr: I/O this shift:  · In: 240 [I.V.:240]  · Out: 40 [Urine:40]   · General Appearance: confused  · HEENT:  Head: Normocephalic, no lesions, without obvious abnormality. · Neck: no adenopathy, no carotid bruit, no JVD, supple, symmetrical, trachea midline and thyroid not enlarged, symmetric, no tenderness/mass/nodules  · Lung: diminished breath sounds bilaterally  · Heart: regular rate and rhythm, S1, S2 normal, no murmur, click, rub or gallop  · Abdomen: soft, non-tender; bowel sounds normal; no masses,  no organomegaly  · Extremities:  extremities normal, atraumatic, no cyanosis or edema  · Musculokeletal: No joint swelling, no muscle tenderness. ROM normal in all joints of extremities.    · Neurologic: Mental status: confused    Labs     CBC:   Lab Results   Component Value Date    WBC 5.2 07/28/2018    RBC 3.32 07/28/2018    HGB 11.7 07/28/2018    HCT 34.9 07/28/2018     07/28/2018    .1 07/28/2018     BMP:    Lab Results   Component Value Date     07/28/2018    K 5.1 07/28/2018    K 3.7 04/05/2018    CL 94 07/28/2018    CO2 22 07/28/2018     07/28/2018    CREATININE 1.6 07/28/2018    GLUCOSE 126 07/28/2018    CALCIUM 8.7 07/28/2018     Hepatic Function Panel:    Lab Results   Component Value Date    [facility] 175 07/28/2018    AST 34 07/28/2018    ALT 23 07/28/2018    PROT 7.2 07/28/2018    LABALBU 3.8 07/28/2018    BILITOT 1.4 07/28/2018     Cardiac Enzymes:   Lab Results   Component Value Date    TROPONINI 0.14 (H) 07/28/2018    TROPONINI 0.16 (H) 07/25/2018    TROPONINI 0.18 (H) 07/25/2018     PT/INR:    Lab Results   Component Value Date    PROTIME 10.5 09/12/2014    INR 1.0 09/12/2014     ABG:  No results found for: PHART, DSR4FWS, PO2ART, Z0IQQIQK, KOX9TZM, BEART  TSH:  No results found for: TSH     Notable Cultures:       Culture  Date sent  Result    Nasal      Sputum      Body Fluid      Urine Strep pneumonia Ag        Urine Legionella Ag        Blood        Urine          Antibiotic  Days  Day started                                       Oxygen:   Nasal cannula L/min     Face mask %     Reservoirs mask %       ABG   Vent Settings     PH  7.383 Mode      PCO2  36.1 TV      PO2  95.4 RR      HCO3  21 PS      Sat%   PEEP      FIO2   FIO2        P/F          Lines:  Site  Day  Date inserted     TLC   R femoral       07/28     PICC              Arterial line              Peripheral line                           DVT Prophylaxis      Heparin    x     Enoxaparin        PCDs        Imaging studies:   Imaging  Date  Result    CXR  07/28    1. Cardiomegaly associated with small to moderate size pleural   effusions and perihilar vascular congestion. 2. Mild to moderate consolidation and airspace opacities of the lower   lungs. 3. Expiratory view.       Compared to prior study, interval increase in size of the pleural   effusions and bibasilar atelectasis. EKG:       Resident's Assessment & Plan     Neurology  Acute encephalopathy, Dementia vs Delirium  Altered mental status  CT scan negative for intracranial pathology  UTI, send for urine culture  Was on Keppra, Dilantin and digoxin.    Check ammonia and TSH to r/o other causes   Check drug levels, Serum Carbamazepine level elevated   Hx of documentation was conducted and revisions were made as appropriate. I agree with the above documented exam, problem list and plan of care. Refer to my Rapid response note      Care reviewed with nursing staff, medical and surgical specialty care, primary care and the patient's family as available. \    Chart review/lab review/X-ray viewing/documentation and had long Conversation with patient/family re: prognosis, care options and any end of life issues:      Critical care time spent reviewing labs/films, examining patient, collaborating with other physicians more than 31  Minutes  excluding procedures . Wendy Roberson M.D.   7/28/2018  11:11 PM

## 2018-07-30 PROBLEM — N18.30 CKD (CHRONIC KIDNEY DISEASE) STAGE 3, GFR 30-59 ML/MIN (HCC): Chronic | Status: ACTIVE | Noted: 2018-07-30

## 2018-07-30 PROBLEM — E44.0 MODERATE PROTEIN-CALORIE MALNUTRITION (HCC): Chronic | Status: ACTIVE | Noted: 2018-07-30

## 2018-07-30 PROBLEM — J90 PLEURAL EFFUSION: Status: ACTIVE | Noted: 2018-07-30

## 2018-07-30 LAB
ALBUMIN SERPL-MCNC: 3.3 G/DL (ref 3.5–5.2)
ALP BLD-CCNC: 150 U/L (ref 35–104)
ALT SERPL-CCNC: 29 U/L (ref 0–32)
ANION GAP SERPL CALCULATED.3IONS-SCNC: 14 MMOL/L (ref 7–16)
ANION GAP SERPL CALCULATED.3IONS-SCNC: 18 MMOL/L (ref 7–16)
AST SERPL-CCNC: 47 U/L (ref 0–31)
BASOPHILS ABSOLUTE: 0.02 E9/L (ref 0–0.2)
BASOPHILS RELATIVE PERCENT: 0.3 % (ref 0–2)
BILIRUB SERPL-MCNC: 1 MG/DL (ref 0–1.2)
BUN BLDV-MCNC: 115 MG/DL (ref 8–23)
BUN BLDV-MCNC: 120 MG/DL (ref 8–23)
CALCIUM SERPL-MCNC: 7.9 MG/DL (ref 8.6–10.2)
CALCIUM SERPL-MCNC: 8.4 MG/DL (ref 8.6–10.2)
CHLORIDE BLD-SCNC: 100 MMOL/L (ref 98–107)
CHLORIDE BLD-SCNC: 95 MMOL/L (ref 98–107)
CO2: 22 MMOL/L (ref 22–29)
CO2: 24 MMOL/L (ref 22–29)
CREAT SERPL-MCNC: 1.9 MG/DL (ref 0.5–1)
CREAT SERPL-MCNC: 1.9 MG/DL (ref 0.5–1)
EOSINOPHILS ABSOLUTE: 0.09 E9/L (ref 0.05–0.5)
EOSINOPHILS RELATIVE PERCENT: 1.5 % (ref 0–6)
GFR AFRICAN AMERICAN: 31
GFR AFRICAN AMERICAN: 31
GFR NON-AFRICAN AMERICAN: 26 ML/MIN/1.73
GFR NON-AFRICAN AMERICAN: 26 ML/MIN/1.73
GLUCOSE BLD-MCNC: 105 MG/DL (ref 74–109)
GLUCOSE BLD-MCNC: 152 MG/DL (ref 74–109)
GRAM STAIN ORDERABLE: NORMAL
HCT VFR BLD CALC: 37.1 % (ref 34–48)
HEMOGLOBIN: 12.7 G/DL (ref 11.5–15.5)
IMMATURE GRANULOCYTES #: 0.02 E9/L
IMMATURE GRANULOCYTES %: 0.3 % (ref 0–5)
LYMPHOCYTES ABSOLUTE: 0.84 E9/L (ref 1.5–4)
LYMPHOCYTES RELATIVE PERCENT: 13.9 % (ref 20–42)
MAGNESIUM: 2.8 MG/DL (ref 1.6–2.6)
MCH RBC QN AUTO: 35.3 PG (ref 26–35)
MCHC RBC AUTO-ENTMCNC: 34.2 % (ref 32–34.5)
MCV RBC AUTO: 103.1 FL (ref 80–99.9)
MONOCYTES ABSOLUTE: 0.73 E9/L (ref 0.1–0.95)
MONOCYTES RELATIVE PERCENT: 12.1 % (ref 2–12)
MRSA CULTURE ONLY: NORMAL
NEUTROPHILS ABSOLUTE: 4.35 E9/L (ref 1.8–7.3)
NEUTROPHILS RELATIVE PERCENT: 71.9 % (ref 43–80)
PDW BLD-RTO: 17 FL (ref 11.5–15)
PLATELET # BLD: 143 E9/L (ref 130–450)
PMV BLD AUTO: 10.6 FL (ref 7–12)
POTASSIUM REFLEX MAGNESIUM: 5.1 MMOL/L (ref 3.5–5)
POTASSIUM SERPL-SCNC: 5 MMOL/L (ref 3.5–5)
RBC # BLD: 3.6 E12/L (ref 3.5–5.5)
SODIUM BLD-SCNC: 135 MMOL/L (ref 132–146)
SODIUM BLD-SCNC: 138 MMOL/L (ref 132–146)
TOTAL PROTEIN: 6.3 G/DL (ref 6.4–8.3)
WBC # BLD: 6.1 E9/L (ref 4.5–11.5)

## 2018-07-30 PROCEDURE — 6360000002 HC RX W HCPCS: Performed by: INTERNAL MEDICINE

## 2018-07-30 PROCEDURE — 02HV33Z INSERTION OF INFUSION DEVICE INTO SUPERIOR VENA CAVA, PERCUTANEOUS APPROACH: ICD-10-PCS | Performed by: INTERNAL MEDICINE

## 2018-07-30 PROCEDURE — 2580000003 HC RX 258: Performed by: STUDENT IN AN ORGANIZED HEALTH CARE EDUCATION/TRAINING PROGRAM

## 2018-07-30 PROCEDURE — 36415 COLL VENOUS BLD VENIPUNCTURE: CPT

## 2018-07-30 PROCEDURE — 2060000000 HC ICU INTERMEDIATE R&B

## 2018-07-30 PROCEDURE — 97164 PT RE-EVAL EST PLAN CARE: CPT

## 2018-07-30 PROCEDURE — 97535 SELF CARE MNGMENT TRAINING: CPT

## 2018-07-30 PROCEDURE — 80048 BASIC METABOLIC PNL TOTAL CA: CPT

## 2018-07-30 PROCEDURE — 83735 ASSAY OF MAGNESIUM: CPT

## 2018-07-30 PROCEDURE — 94640 AIRWAY INHALATION TREATMENT: CPT

## 2018-07-30 PROCEDURE — G8988 SELF CARE GOAL STATUS: HCPCS

## 2018-07-30 PROCEDURE — 2580000003 HC RX 258: Performed by: INTERNAL MEDICINE

## 2018-07-30 PROCEDURE — 6370000000 HC RX 637 (ALT 250 FOR IP): Performed by: INTERNAL MEDICINE

## 2018-07-30 PROCEDURE — 80053 COMPREHEN METABOLIC PANEL: CPT

## 2018-07-30 PROCEDURE — 85025 COMPLETE CBC W/AUTO DIFF WBC: CPT

## 2018-07-30 PROCEDURE — 99233 SBSQ HOSP IP/OBS HIGH 50: CPT | Performed by: INTERNAL MEDICINE

## 2018-07-30 PROCEDURE — 87088 URINE BACTERIA CULTURE: CPT

## 2018-07-30 PROCEDURE — 36569 INSJ PICC 5 YR+ W/O IMAGING: CPT

## 2018-07-30 PROCEDURE — C1751 CATH, INF, PER/CENT/MIDLINE: HCPCS

## 2018-07-30 PROCEDURE — 87186 SC STD MICRODIL/AGAR DIL: CPT

## 2018-07-30 PROCEDURE — G8987 SELF CARE CURRENT STATUS: HCPCS

## 2018-07-30 PROCEDURE — 87077 CULTURE AEROBIC IDENTIFY: CPT

## 2018-07-30 PROCEDURE — 97168 OT RE-EVAL EST PLAN CARE: CPT

## 2018-07-30 PROCEDURE — 97530 THERAPEUTIC ACTIVITIES: CPT

## 2018-07-30 PROCEDURE — 6360000002 HC RX W HCPCS: Performed by: STUDENT IN AN ORGANIZED HEALTH CARE EDUCATION/TRAINING PROGRAM

## 2018-07-30 PROCEDURE — 2700000000 HC OXYGEN THERAPY PER DAY

## 2018-07-30 RX ORDER — PIPERACILLIN SODIUM, TAZOBACTAM SODIUM 3; .375 G/15ML; G/15ML
3.38 INJECTION, POWDER, LYOPHILIZED, FOR SOLUTION INTRAVENOUS EVERY 12 HOURS
Status: DISCONTINUED | OUTPATIENT
Start: 2018-07-30 | End: 2018-07-30 | Stop reason: SDUPTHER

## 2018-07-30 RX ORDER — SODIUM CHLORIDE 9 MG/ML
INJECTION, SOLUTION INTRAVENOUS CONTINUOUS
Status: DISCONTINUED | OUTPATIENT
Start: 2018-07-30 | End: 2018-07-30

## 2018-07-30 RX ADMIN — WATER 1 G: 1 INJECTION INTRAMUSCULAR; INTRAVENOUS; SUBCUTANEOUS at 04:17

## 2018-07-30 RX ADMIN — PIPERACILLIN SODIUM, TAZOBACTAM SODIUM 3.38 G: 3; .375 INJECTION, POWDER, LYOPHILIZED, FOR SOLUTION INTRAVENOUS at 23:55

## 2018-07-30 RX ADMIN — DIGOXIN 125 MCG: 125 TABLET ORAL at 09:32

## 2018-07-30 RX ADMIN — PIPERACILLIN SODIUM, TAZOBACTAM SODIUM 3.38 G: 3; .375 INJECTION, POWDER, LYOPHILIZED, FOR SOLUTION INTRAVENOUS at 16:13

## 2018-07-30 RX ADMIN — Medication 0.5 MG: at 17:23

## 2018-07-30 RX ADMIN — Medication 0.5 MG: at 12:22

## 2018-07-30 RX ADMIN — ASPIRIN 81 MG CHEWABLE TABLET 81 MG: 81 TABLET CHEWABLE at 09:32

## 2018-07-30 RX ADMIN — Medication 0.5 MG: at 09:23

## 2018-07-30 RX ADMIN — Medication 10 ML: at 23:56

## 2018-07-30 RX ADMIN — PANTOPRAZOLE SODIUM 40 MG: 40 TABLET, DELAYED RELEASE ORAL at 06:51

## 2018-07-30 RX ADMIN — Medication 10 ML: at 04:17

## 2018-07-30 RX ADMIN — SODIUM CHLORIDE: 9 INJECTION, SOLUTION INTRAVENOUS at 11:35

## 2018-07-30 RX ADMIN — VANCOMYCIN HYDROCHLORIDE 1.5 G: 10 INJECTION, POWDER, LYOPHILIZED, FOR SOLUTION INTRAVENOUS at 12:48

## 2018-07-30 ASSESSMENT — PAIN SCALES - GENERAL
PAINLEVEL_OUTOF10: 0
PAINLEVEL_OUTOF10: 8

## 2018-07-30 ASSESSMENT — PAIN DESCRIPTION - DESCRIPTORS: DESCRIPTORS: SHARP;SHOOTING

## 2018-07-30 ASSESSMENT — PAIN DESCRIPTION - FREQUENCY: FREQUENCY: INTERMITTENT

## 2018-07-30 ASSESSMENT — PAIN DESCRIPTION - PAIN TYPE: TYPE: ACUTE PAIN

## 2018-07-30 ASSESSMENT — PAIN DESCRIPTION - ONSET: ONSET: GRADUAL

## 2018-07-30 ASSESSMENT — PAIN DESCRIPTION - LOCATION: LOCATION: VAGINA

## 2018-07-30 NOTE — PROGRESS NOTES
Nutrition Assessment    Type and Reason for Visit: Initial    Nutrition Recommendations: Continue current diet, Start ONS (Ensure+ BID per d/w pt. Count in fluid restriction )  Monitor renals labs- noted slight hyperkalemia & hypermagnesemia at this time     Malnutrition Assessment:  · Malnutrition Status: Meets the criteria for moderate malnutrition  · Context: Chronic illness  · Findings of the 6 clinical characteristics of malnutrition (Minimum of 2 out of 6 clinical characteristics is required to make the diagnosis of moderate or severe Protein Calorie Malnutrition based on AND/ASPEN Guidelines):  1. Energy Intake-Less than or equal to 75%, greater than or equal to 3 months    2. Weight Loss-No significant weight loss  3. Fat Loss-Moderate subcutaneous fat loss, Orbital  4. Muscle Loss-Moderate muscle mass loss, Temples (temporalis muscle), Clavicles (pectoralis and deltoids)  5. Fluid Accumulation-No significant fluid accumulation  6.  Strength-Not measured    Nutrition Diagnosis:   · Problem: Moderate malnutrition, in context of chronic illness  · Etiology: related to Catabolic illness     Signs and symptoms:  as evidenced by Diet history of poor intake, Intake 25-50%, Moderate loss of subcutaneous fat, Moderate muscle loss    Nutrition Assessment:  · Nutrition-Focused Physical Findings: pt alert, very talkative, s/p RRT, hypotension (not on pressor), fluid bal WNL, +2 pitting BLE edema, active BS, weakness      · Wound Type: Skin Tears, Multiple (abrasion noted )     · Current Nutrition Therapies:  · Oral Diet Orders: No Added Salt (3-4gm), Cardiac   · Oral Diet intake: 26-50% (average since admit.  Pt reports eating mostly frozen dinners PTA)     · Anthropometric Measures:  · Ht: 5' 7\" (170.2 cm)   · Current Body Wt: 144 lb (65.3 kg) (7/30 actual )  · Admission Body Wt: 144 lb (65.3 kg) (7/26 first measured)  · Usual Body Wt: 142 lb (64.4 kg) (4/2018 EMR actual )  · % Weight Change: wt hx appears stable per EMR   · Ideal Body Wt: 135 lb (61.2 kg), % Ideal Body 107%  · BMI Classification: BMI 18.5 - 24.9 Normal Weight     · Comparative Standards (Estimated Nutrition Needs):  · Estimated Daily Total Kcal: 7771-4112 (MSJ REE 1183 x 1.3 SF)  · Estimated Daily Protein (g):     Estimated Intake vs Estimated Needs: Intake Less Than Needs    Nutrition Risk Level: Moderate    Nutrition Interventions: Continued Inpatient Monitoring, Coordination of Care, Education Initiated (supplement options discussed. Previous CHF diet education provided)    Nutrition Evaluation:   · Evaluation: Goals set   · Goals: Pt to consume >50% meals and ONS     · Monitoring: Meal Intake, Supplement Intake, Comparative Standards, Weight, Pertinent Labs, Diet Tolerance, Skin Integrity, Fluid Balance, Ascites/Edema, Patient/Family Education    See Adult Nutrition Doc Flowsheet for more detail.      Electronically signed by Juan Perez RD, PRINCESS on 7/30/18 at 1:50 PM    Contact Number: Ext [phone]

## 2018-07-30 NOTE — PROGRESS NOTES
Nebulization 4x daily    carvedilol  12.5 mg Oral BID WC    pantoprazole  40 mg Oral QAM AC    sodium chloride flush  10 mL Intravenous 2 times per day    heparin (porcine)  5,000 Units Subcutaneous 3 times per day    aspirin  81 mg Oral Daily    digoxin  125 mcg Oral Daily     PRN Meds: sodium chloride flush, magnesium hydroxide  Nutrition:   Carbo control diet    Labs and Imaging Studies     CBC:   Recent Labs      07/28/18   1135  07/29/18   0620  07/30/18   0630   WBC  5.2  4.9  6.1   HGB  11.7  12.1  12.7   HCT  34.9  36.1  37.1   MCV  105.1*  104.3*  103.1*   PLT  132  152  143       BMP:    Recent Labs      07/28/18   1135  07/29/18   0620 07/30/18   0630   NA  134  139  135   K  5.1*  5.4*  5.1*   CL  94*  99  95*   CO2  22  19*  22   BUN  103*  108*  120*   CREATININE  1.6*  1.8*  1.9*   GLUCOSE  126*  118*  105       LIVER PROFILE:   Recent Labs      07/28/18   1135  07/28/18   1630  07/29/18   0620  07/30/18   0630   AST  34*   --   42*  47*   ALT  23   --   26  29   LIPASE   --   63*   --    --    BILITOT  1.4*   --   1.5*  1.0   ALKPHOS  175*   --   174*  150*       PT/INR:   No results for input(s): PROTIME, INR in the last 72 hours. APTT:   No results for input(s): APTT in the last 72 hours.     Fasting Lipid Panel:    No results found for: CHOL, TRIG, HDL    Cardiac Enzymes:    Lab Results   Component Value Date    TROPONINI 0.13 (H) 07/29/2018    TROPONINI 0.14 (H) 07/28/2018    TROPONINI 0.16 (H) 07/25/2018       Notable Cultures:      Blood cultures   Blood Culture, Routine   Date Value Ref Range Status   07/28/2018 24 Hours- no growth  Preliminary     Respiratory cultures No results found for: RESPCULTURE   Gram Stain Result   Date Value Ref Range Status   07/29/2018 Refer to ordered Gram stain for results  Final     Urine No results found for: LABURIN  Legionella No results found for: LABLEGI  C Diff PCR No results found for: CDIFPCR  Wound culture/abscess: No results for input(s):

## 2018-07-30 NOTE — PROGRESS NOTES
Pharmacy Consultation Note  (Antibiotic Dosing and Monitoring)    Initial consult date: 2018  Consulting physician: Dr. Ana Calderon  Drug(s): vancomycin   Indication: HCAP  Age/Gender IBW DW  Allergy Information   78 y.o./F; 170.2 cm  65.7 kg  Nsaids             Date  WBC BUN/CR Drug/Dose Time   Given Level(s)   (Time) Comments    6.1 120/1.9 Vancomycin 1500 mg x1 1248          vanco random @ 1200                        Estimated Creatinine Clearance: 24 mL/min (A) (based on SCr of 1.9 mg/dL (H)). Intake/Output Summary (Last 24 hours) at 18 1422  Last data filed at 18 1300   Gross per 24 hour   Intake              740 ml   Output              910 ml   Net             -170 ml   UO = 0.3 mL/kg/hr (485 mL)    Temp max: Temp (24hrs), Av.5 °F (35.8 °C), Min:96.2 °F (35.7 °C), Max:96.8 °F (36 °C)    Cultures:  available culture and sensitivity results were reviewed in [facility]      Assessment:  · Consulted by Dr. Herminia Krueger to dose/monitor vancomycin. · Goal trough level:  15-20 mcg/mL. · 79 yo/F, CKD-3 baseline Cr = 1.5; Cr increasing past few days and is 1.9 today. Zainab [place] has decreased the past 48 hrs.    · S/P R thoracentesis on  for pleural effusion (-1200 mL).    · CXR today shows residual pleural effusion and possible RLL infiltrates. Was on ceftriaxone in MICU which was changed to vanco and pip-tazo today () for suspected HCAP.    · PCT = 0.22 ()    Plan:  · Vancomycin 1500 mg x1 today (done). · Check random vanco level on  @ 1200. Re-dose if < 20 mCg/mL. · Pharmacist will follow and monitor/adjust dosing as necessary. · Monitor renal function and UO.     Stacia Castelan, PharmD  2018  2:28 PM  Pager: [phone]

## 2018-07-30 NOTE — PLAN OF CARE
Problem: Falls - Risk of:  Goal: Will remain free from falls  Will remain free from falls   Outcome: Met This Shift      Problem: Risk for Impaired Skin Integrity  Goal: Tissue integrity - skin and mucous membranes  Structural intactness and normal physiological function of skin and  mucous membranes. Outcome: Met This Shift      Problem: Pain:  Goal: Pain level will decrease  Pain level will decrease   Outcome: Met This Shift  No complaints of pain this shift.

## 2018-07-30 NOTE — PROGRESS NOTES
[facility]  Physical Therapy Initial Re-Evaluation  Name: Smiley Gunderson  : 1940  MRN: [id number]    Date of Service: 2018    Evaluating Therapist: Onofre Judd PT, DPT  [id number]    Room #: 6788  DIAGNOSIS: CHF   S/p RRT  for AMS  PROCEDURE: s/p thoracentesis   PRECAUTIONS: Falls, O  PMH: A-fib, CAD, CHF, glaucoma B eyes, Gout, HLD, HTN, MS, Neuropathy, Pacer/AICD, L elbow ORIF 2018    Social:  Pt lives alone in a 2 story home with 3 stairs and 2 rail/s to enter. Pt reports that her bed is on the second floor, but her significant other (Renny Deal) brought it down to the first floor. There is a half bath on the 1st floor. Pt has multiple cats at home. There is 10-12 steps and B HR to second floor. Prior to admission pt ambulated with [employer] and uses home O2. Initial Evaluation  Date:  Treatment  Short Term/ Long Term   Goals   Was pt agreeable to Eval/treatment? Yes     Does pt have pain? no     Bed Mobility  Rolling: Mod A  Supine to sit: Mod A  Sit to supine: Mod A  Scooting: Max A to EOB  SBA   Transfers Sit to stand: Min A from elevated bed  Stand to sit: Min A   Stand pivot: Min A using [employer]  SBA   Ambulation   20 feet x2 reps using [employer] with Min<>Mod A  >75 feet using AAD with SBA   Stair negotiation: ascended and descended  NT  4 steps using B rail with Min A   AM-PAC Basic Mobility Inpatient Short Form Raw Score:           Vitals:  Heart Rate at rest 75 bpm   SPO2 at rest 96% on 3L O2    Blood Pressure at rest 112/70      Patient is A&Ox3. BLE ROM is WFL. BLE strength is grossly 4-/5. Balance: Min<>Mod A; Assist varies based on pt's BUE support. Pt required increased assist when only supported by 1 UE on [employer] or sink. Sensation: pt denied numbness/tingling   Edema: present in BLE  Endurance: fair (-)     ASSESSMENT  Pt displays functional ability as noted in the objective portion of this evaluation. Comments/Treatment:  Pt was cleared by RN prior to PT evaluation. Pt was received supine and was agreeable to PT evaluation. Pt required assist for LLE and to right trunk from elevated bed surface when transferring to sitting EOB. STS performed and pt amb to sink for functional tasks. Pt required constant VCs for upright posture, forward gaze and [employer] approximation. Kyphotic posture with deep cervical flexion noted throughout session that worsened with fatigue. Pt amb back to bedside and was assisted back to supine. Pt was left with transport staff for transfer to monitored floor. Call button within reach and all needs met. Pt demonstrates decreased safety awareness and poor insight into deficits. Pt would benefit from continued PT following discharge to increase LOF prior to returning home. Patient education  Pt educated on PT role, transfer technique, gait training, posture and bed mobility. Patient response to education:   Pt verbalized understanding Pt demonstrated skill Pt requires further education in this area   yes requires assist yes     Rehab potential is Good for reaching above PT goals. Pts/ family goals   1. To return home. Patient and or family understand(s) diagnosis, prognosis, and plan of care. PLAN  PT care will be provided in accordance with the objectives noted above. Whenever appropriate, clear delegation orders will be provided for nursing staff. Exercises and functional mobility practice will be used as well as appropriate assistive devices or modalities to obtain goals. Patient and family education will also be administered as needed. Frequency of treatments will be 2-5x/week x 7-10 days.     Time in: 1310  Time out: [address] [place] [facility]  License [id number]

## 2018-07-31 LAB
ALBUMIN SERPL-MCNC: 3.3 G/DL (ref 3.5–5.2)
ALP BLD-CCNC: 168 U/L (ref 35–104)
ALT SERPL-CCNC: 32 U/L (ref 0–32)
ANION GAP SERPL CALCULATED.3IONS-SCNC: 15 MMOL/L (ref 7–16)
AST SERPL-CCNC: 42 U/L (ref 0–31)
BASOPHILS ABSOLUTE: 0.01 E9/L (ref 0–0.2)
BASOPHILS RELATIVE PERCENT: 0.1 % (ref 0–2)
BILIRUB SERPL-MCNC: 1 MG/DL (ref 0–1.2)
BODY FLUID CULTURE, STERILE: NORMAL
BUN BLDV-MCNC: 109 MG/DL (ref 8–23)
CALCIUM SERPL-MCNC: 8.2 MG/DL (ref 8.6–10.2)
CHLORIDE BLD-SCNC: 97 MMOL/L (ref 98–107)
CO2: 24 MMOL/L (ref 22–29)
CREAT SERPL-MCNC: 1.9 MG/DL (ref 0.5–1)
EKG ATRIAL RATE: 18 BPM
EKG ATRIAL RATE: 49 BPM
EKG Q-T INTERVAL: 392 MS
EKG Q-T INTERVAL: 670 MS
EKG QRS DURATION: 148 MS
EKG QRS DURATION: 160 MS
EKG QTC CALCULATION (BAZETT): 484 MS
EKG QTC CALCULATION (BAZETT): 815 MS
EKG R AXIS: 141 DEGREES
EKG R AXIS: 180 DEGREES
EKG T AXIS: -12 DEGREES
EKG T AXIS: -19 DEGREES
EKG VENTRICULAR RATE: 89 BPM
EKG VENTRICULAR RATE: 92 BPM
EOSINOPHILS ABSOLUTE: 0.08 E9/L (ref 0.05–0.5)
EOSINOPHILS RELATIVE PERCENT: 1.1 % (ref 0–6)
GFR AFRICAN AMERICAN: 31
GFR NON-AFRICAN AMERICAN: 26 ML/MIN/1.73
GLUCOSE BLD-MCNC: 103 MG/DL (ref 74–109)
GRAM STAIN RESULT: NORMAL
HCT VFR BLD CALC: 35.9 % (ref 34–48)
HEMOGLOBIN: 12.4 G/DL (ref 11.5–15.5)
IMMATURE GRANULOCYTES #: 0.04 E9/L
IMMATURE GRANULOCYTES %: 0.5 % (ref 0–5)
LYMPHOCYTES ABSOLUTE: 0.71 E9/L (ref 1.5–4)
LYMPHOCYTES RELATIVE PERCENT: 9.6 % (ref 20–42)
MAGNESIUM: 2.7 MG/DL (ref 1.6–2.6)
MCH RBC QN AUTO: 35.1 PG (ref 26–35)
MCHC RBC AUTO-ENTMCNC: 34.5 % (ref 32–34.5)
MCV RBC AUTO: 101.7 FL (ref 80–99.9)
MONOCYTES ABSOLUTE: 0.93 E9/L (ref 0.1–0.95)
MONOCYTES RELATIVE PERCENT: 12.6 % (ref 2–12)
NEUTROPHILS ABSOLUTE: 5.61 E9/L (ref 1.8–7.3)
NEUTROPHILS RELATIVE PERCENT: 76.1 % (ref 43–80)
PDW BLD-RTO: 17 FL (ref 11.5–15)
PLATELET # BLD: 146 E9/L (ref 130–450)
PMV BLD AUTO: 11.2 FL (ref 7–12)
POTASSIUM REFLEX MAGNESIUM: 4.8 MMOL/L (ref 3.5–5)
RBC # BLD: 3.53 E12/L (ref 3.5–5.5)
SODIUM BLD-SCNC: 136 MMOL/L (ref 132–146)
TOTAL PROTEIN: 6.2 G/DL (ref 6.4–8.3)
VANCOMYCIN RANDOM: 11.2 MCG/ML (ref 5–40)
WBC # BLD: 7.4 E9/L (ref 4.5–11.5)

## 2018-07-31 PROCEDURE — 80202 ASSAY OF VANCOMYCIN: CPT

## 2018-07-31 PROCEDURE — 2060000000 HC ICU INTERMEDIATE R&B

## 2018-07-31 PROCEDURE — 2580000003 HC RX 258

## 2018-07-31 PROCEDURE — 93010 ELECTROCARDIOGRAM REPORT: CPT | Performed by: INTERNAL MEDICINE

## 2018-07-31 PROCEDURE — 2580000003 HC RX 258: Performed by: INTERNAL MEDICINE

## 2018-07-31 PROCEDURE — 36415 COLL VENOUS BLD VENIPUNCTURE: CPT

## 2018-07-31 PROCEDURE — 6360000002 HC RX W HCPCS

## 2018-07-31 PROCEDURE — 36591 DRAW BLOOD OFF VENOUS DEVICE: CPT

## 2018-07-31 PROCEDURE — 80053 COMPREHEN METABOLIC PANEL: CPT

## 2018-07-31 PROCEDURE — 94640 AIRWAY INHALATION TREATMENT: CPT

## 2018-07-31 PROCEDURE — 6370000000 HC RX 637 (ALT 250 FOR IP): Performed by: INTERNAL MEDICINE

## 2018-07-31 PROCEDURE — 6360000002 HC RX W HCPCS: Performed by: INTERNAL MEDICINE

## 2018-07-31 PROCEDURE — 99232 SBSQ HOSP IP/OBS MODERATE 35: CPT | Performed by: INTERNAL MEDICINE

## 2018-07-31 PROCEDURE — 2700000000 HC OXYGEN THERAPY PER DAY

## 2018-07-31 PROCEDURE — 83735 ASSAY OF MAGNESIUM: CPT

## 2018-07-31 PROCEDURE — 85025 COMPLETE CBC W/AUTO DIFF WBC: CPT

## 2018-07-31 PROCEDURE — 2580000003 HC RX 258: Performed by: STUDENT IN AN ORGANIZED HEALTH CARE EDUCATION/TRAINING PROGRAM

## 2018-07-31 RX ADMIN — Medication 0.5 MG: at 16:40

## 2018-07-31 RX ADMIN — CARVEDILOL 12.5 MG: 6.25 TABLET, FILM COATED ORAL at 09:08

## 2018-07-31 RX ADMIN — VANCOMYCIN HYDROCHLORIDE 1000 MG: 1 INJECTION, POWDER, LYOPHILIZED, FOR SOLUTION INTRAVENOUS at 16:04

## 2018-07-31 RX ADMIN — PIPERACILLIN SODIUM, TAZOBACTAM SODIUM 3.38 G: 3; .375 INJECTION, POWDER, LYOPHILIZED, FOR SOLUTION INTRAVENOUS at 23:26

## 2018-07-31 RX ADMIN — PANTOPRAZOLE SODIUM 40 MG: 40 TABLET, DELAYED RELEASE ORAL at 07:10

## 2018-07-31 RX ADMIN — Medication 0.5 MG: at 09:23

## 2018-07-31 RX ADMIN — PIPERACILLIN SODIUM, TAZOBACTAM SODIUM 3.38 G: 3; .375 INJECTION, POWDER, LYOPHILIZED, FOR SOLUTION INTRAVENOUS at 17:24

## 2018-07-31 RX ADMIN — Medication 10 ML: at 04:22

## 2018-07-31 RX ADMIN — PIPERACILLIN SODIUM, TAZOBACTAM SODIUM 3.38 G: 3; .375 INJECTION, POWDER, LYOPHILIZED, FOR SOLUTION INTRAVENOUS at 09:08

## 2018-07-31 RX ADMIN — Medication 10 ML: at 21:33

## 2018-07-31 RX ADMIN — Medication 0.5 MG: at 13:32

## 2018-07-31 RX ADMIN — DIGOXIN 125 MCG: 125 TABLET ORAL at 09:08

## 2018-07-31 RX ADMIN — Medication 0.5 MG: at 21:42

## 2018-07-31 RX ADMIN — Medication 10 ML: at 09:09

## 2018-07-31 RX ADMIN — Medication 10 ML: at 16:04

## 2018-07-31 RX ADMIN — CARVEDILOL 12.5 MG: 6.25 TABLET, FILM COATED ORAL at 17:24

## 2018-07-31 RX ADMIN — ASPIRIN 81 MG CHEWABLE TABLET 81 MG: 81 TABLET CHEWABLE at 09:08

## 2018-07-31 ASSESSMENT — PAIN SCALES - GENERAL
PAINLEVEL_OUTOF10: 0

## 2018-07-31 NOTE — CARE COORDINATION
7/31/2018 social work:discharge planning  Was notified that patient wishes to go to snf not home now. Discussed this and sw role with patient. Plan is to snf and she chose [facility] and would not give other choices. Referral made. Called for stat pt and ot updates.

## 2018-07-31 NOTE — PROGRESS NOTES
Subjective: The patient is awake and alert. Status post thoracentesis (7/29/2018). Denies chest pain, angina, and dyspnea. Denies abdominal pain. Tolerating diet. No nausea or vomiting. Objective:    BP 99/63   Pulse 66   Temp 96.8 °F (36 °C) (Temporal)   Resp 18   Ht 5' 7\" (1.702 m)   Wt 144 lb 14.4 oz (65.7 kg)   SpO2 94%   BMI 22.69 kg/m²     Current medications that patient is taking have been reviewed. Heart:  RRR, no murmurs, gallops, or rubs.   Lungs:  CTA bilaterally, no wheeze, rales or rhonchi  Abd: bowel sounds present, nontender, nondistended, no masses  Extrem:  1+ lower extremity edema    CBC with Differential:    Lab Results   Component Value Date    WBC 7.4 07/31/2018    RBC 3.53 07/31/2018    HGB 12.4 07/31/2018    HCT 35.9 07/31/2018     07/31/2018    .7 07/31/2018    MCH 35.1 07/31/2018    MCHC 34.5 07/31/2018    RDW 17.0 07/31/2018    LYMPHOPCT 9.6 07/31/2018    MONOPCT 12.6 07/31/2018    BASOPCT 0.1 07/31/2018    MONOSABS 0.93 07/31/2018    LYMPHSABS 0.71 07/31/2018    EOSABS 0.08 07/31/2018    BASOSABS 0.01 07/31/2018     CMP:    Lab Results   Component Value Date     07/31/2018    K 4.8 07/31/2018    CL 97 07/31/2018    CO2 24 07/31/2018     07/31/2018    CREATININE 1.9 07/31/2018    GFRAA 31 07/31/2018    LABGLOM 26 07/31/2018    GLUCOSE 103 07/31/2018    PROT 6.2 07/31/2018    LABALBU 3.3 07/31/2018    CALCIUM 8.2 07/31/2018    BILITOT 1.0 07/31/2018    ALKPHOS 168 07/31/2018    AST 42 07/31/2018    ALT 32 07/31/2018     BMP:    Lab Results   Component Value Date     07/31/2018    K 4.8 07/31/2018    CL 97 07/31/2018    CO2 24 07/31/2018     07/31/2018    LABALBU 3.3 07/31/2018    CREATININE 1.9 07/31/2018    CALCIUM 8.2 07/31/2018    GFRAA 31 07/31/2018    LABGLOM 26 07/31/2018    GLUCOSE 103 07/31/2018     Magnesium:    Lab Results   Component Value Date    MG 2.7 07/31/2018     Phosphorus:    Lab Results   Component Value Date PHOS 5.2 07/28/2018     PT/INR:    Lab Results   Component Value Date    PROTIME 10.5 09/12/2014    INR 1.0 09/12/2014     PTT:  No results found for: APTT, PTT[APTT}     Assessment:    Patient Active Problem List   Diagnosis    S/P ICD (internal cardiac defibrillator) procedure    Biventricular implantable cardioverter-defibrillator in situ    Chronic atrial fibrillation (HCC)    MS (multiple sclerosis) (HCC)    Acute on chronic systolic [facility]congestive [facility] failure) [facility]    Acute kidney injury ([facility] [address]    Metabolic encephalopathy    Pleural effusion    CKD (chronic kidney disease) stage 3, GFR 30-59 ml/min    Moderate protein-calorie malnutrition ([facility]       Plan:    Stable. Stable. Rate controlled. Stable. Repeat echo from 7/29/2018 reviewed. EF of 20%. Diurese as BP and renal function allow. Appreciate cardiology assist.    Secondary to over diuresis.  today. STOP bumex. Gently hydrating. appreciate nephrology input. Possible UTI. Status post thoracentesis. As above. Pt/Ot evaluations for discharge planning. Agree with rehab placement.        Daisy Cantu MD  11:56 AM  7/31/2018

## 2018-07-31 NOTE — PROGRESS NOTES
PharmD  7/31/2018  12:45 PM  Pager: [phone]    Vanc level = 11.2 @ [phone]. Start vancomycin 1000 mg q24h today @ 1600.     Michelle Aly PharmD  7/31/2018  2:20 PM  Pager: [phone]

## 2018-07-31 NOTE — PROGRESS NOTES
Patients home meds delivered to [facility] and given to unit secretary, which were left on micu

## 2018-07-31 NOTE — PROGRESS NOTES
[facility] Roberto A. Milas Maser, MD Shirly Raven, MD Debbi Bevel, MD Charmayne Sauers, MD  Progress Note    7/31/2018    SUBJECTIVE:   7/31: No new complaint. Feeling a little bit better than yesterday. Appetite improved. Little more energy today. (-) sob at rest on nasal cannula  (-) cp/palp Appetite ok    PROBLEM LIST:    Principal Problem:    Acute on chronic systolic CHF (congestive heart failure) ([facility])  Active Problems:    S/P ICD (internal cardiac defibrillator) procedure    Biventricular implantable cardioverter-defibrillator in situ    Chronic atrial fibrillation ([facility])    MS (multiple sclerosis) ([facility])    Acute kidney injury ([facility] [address]    Metabolic encephalopathy    Pleural effusion    CKD (chronic kidney disease) stage 3, GFR 30-59 ml/min    Moderate protein-calorie malnutrition ([facility])  Resolved Problems:    Non-ischemic cardiomyopathy (HCC)    Acute on chronic systolic CHF (congestive heart failure) ([facility])         DIET:    DIET CARDIAC; No Added Salt (3-4 GM);  Daily Fluid Restriction: 2000 ml  Dietary Nutrition Supplements: Standard High Calorie Oral Supplement     MEDS (scheduled):    piperacillin-tazobactam  3.375 g Intravenous Q8H    vancomycin (VANCOCIN) intermittent dosing (placeholder)   Other RX Placeholder    ipratropium  0.5 mg Nebulization 4x daily    carvedilol  12.5 mg Oral BID WC    pantoprazole  40 mg Oral QAM AC    sodium chloride flush  10 mL Intravenous 2 times per day    heparin (porcine)  5,000 Units Subcutaneous 3 times per day    aspirin  81 mg Oral Daily    digoxin  125 mcg Oral Daily       MEDS (infusions):      MEDS (prn):  sodium chloride flush, magnesium hydroxide    PHYSICAL EXAM:     Patient Vitals for the past 24 hrs:   BP Temp Temp src Pulse Resp SpO2   07/31/18 1142 99/63 96.8 °F (36 °C) Temporal 66 18 94 %   07/31/18 0908 - - - 80 - -   07/31/18 0752 114/66 97.9 °F (36.6 °C) - 74 20 98 %   07/31/18 0422 110/68 97.8 °F (36.6 °C) Temporal 92 20 98 %   07/31/18 0000 112/78 97.9 °F (36.6 °C) Temporal 90 18 97 %   07/30/18 1931 100/70 96.4 °F (35.8 °C) Temporal 92 24 -   07/30/18 1900 - 97.6 °F (36.4 °C) Oral 74 24 98 %   07/30/18 1723 - - - - 20 -   07/30/18 1614 88/62 98.2 °F (36.8 °C) Oral 77 22 98 %   07/30/18 1400 109/60 96.2 °F (35.7 °C) Temporal 76 20 99 %   @      Intake/Output Summary (Last 24 hours) at 07/31/18 1347  Last data filed at 07/31/18 1311   Gross per 24 hour   Intake             1052 ml   Output              600 ml   Net              452 ml         Wt Readings from Last 3 Encounters:   07/30/18 144 lb 14.4 oz (65.7 kg)   07/24/18 154 lb 11.2 oz (70.2 kg)   05/10/18 135 lb (61.2 kg)       Constitutional:  in no acute distress  Oral: mucus membranes moist  Neck: no JVD  Cardiovascular: S1, S2 regular rhythm, no murmur,or rub  Respiratory:  bilateral basilar crackles, no wheeze  Gastrointestinal:  Soft, nontender, nondistended, NABS  Ext: 2+ pitting lower extremity and dependent  edema, feet warm  Skin: dry, no rash  Neuro: awake, alert, interactive      DATA:    Recent Labs      07/29/18   0620  07/30/18   0630  07/31/18   0422   WBC  4.9  6.1  7.4   HGB  12.1  12.7  12.4   HCT  36.1  37.1  35.9   MCV  104.3*  103.1*  101.7*   PLT  152  143  146     Recent Labs      07/29/18   0620  07/30/18   0630  07/30/18   2045  07/31/18   0422   NA  139  135  138  136   K  5.4*  5.1*  5.0  4.8   CL  99  95*  100  97*   CO2  19*  22  24  24   MG  2.8*  2.8*   --   2.7*   BUN  108*  120*  115*  109*   CREATININE  1.8*  1.9*  1.9*  1.9*   ALT  26  29   --   32   AST  42*  47*   --   42*   BILITOT  1.5*  1.0   --   1.0   ALKPHOS  174*  150*   --   168*       No results found for: LABPROT    ASSESSMENT / RECOMMENDATIONS:    1. Acute-on-chronic kidney disease, baseline creatinine 1.3 - 1.4 mg/dL   likely in context of diuresis. 2.  Acute-on-chronic congestive heart failure, mixed systolic and diastolic. EF is 20.   Moderate-to-severe tricuspid regurgitation with moderate pulmonary hypertension. Moderate-to-severe mitral regurgitation. 3.  Bilateral pleural effusion, status post thoracentesis today.     Creatinine stable since holding diuretic therapy. No uremia  'Lytes acceptable. Would not start IV fluid at this point   Continue to hold diuresis for the time being and allow intravascular volume to re-equilibrate.   Continue conservative fluid restriction  Low-sodium diet  Keep feet up when not using them  Follow labs, UO  Avoid nephrotoxins    Electronically signed by Mabel Rouse MD on 7/31/2018 at 1:47 PM

## 2018-08-01 VITALS
TEMPERATURE: 97.3 F | OXYGEN SATURATION: 97 % | WEIGHT: 147.05 LBS | SYSTOLIC BLOOD PRESSURE: 119 MMHG | HEIGHT: 67 IN | RESPIRATION RATE: 18 BRPM | BODY MASS INDEX: 23.08 KG/M2 | DIASTOLIC BLOOD PRESSURE: 67 MMHG | HEART RATE: 82 BPM

## 2018-08-01 LAB
ALBUMIN SERPL-MCNC: 3.1 G/DL (ref 3.5–5.2)
ALP BLD-CCNC: 146 U/L (ref 35–104)
ALT SERPL-CCNC: 28 U/L (ref 0–32)
ANION GAP SERPL CALCULATED.3IONS-SCNC: 14 MMOL/L (ref 7–16)
ANION GAP SERPL CALCULATED.3IONS-SCNC: 14 MMOL/L (ref 7–16)
AST SERPL-CCNC: 57 U/L (ref 0–31)
BASOPHILS ABSOLUTE: 0.02 E9/L (ref 0–0.2)
BASOPHILS RELATIVE PERCENT: 0.3 % (ref 0–2)
BILIRUB SERPL-MCNC: 0.9 MG/DL (ref 0–1.2)
BUN BLDV-MCNC: 92 MG/DL (ref 8–23)
BUN BLDV-MCNC: 99 MG/DL (ref 8–23)
CALCIUM SERPL-MCNC: 7.8 MG/DL (ref 8.6–10.2)
CALCIUM SERPL-MCNC: 8.1 MG/DL (ref 8.6–10.2)
CHLORIDE BLD-SCNC: 100 MMOL/L (ref 98–107)
CHLORIDE BLD-SCNC: 98 MMOL/L (ref 98–107)
CO2: 24 MMOL/L (ref 22–29)
CO2: 25 MMOL/L (ref 22–29)
CREAT SERPL-MCNC: 1.6 MG/DL (ref 0.5–1)
CREAT SERPL-MCNC: 1.7 MG/DL (ref 0.5–1)
EOSINOPHILS ABSOLUTE: 0.17 E9/L (ref 0.05–0.5)
EOSINOPHILS RELATIVE PERCENT: 2.5 % (ref 0–6)
GFR AFRICAN AMERICAN: 35
GFR AFRICAN AMERICAN: 38
GFR NON-AFRICAN AMERICAN: 29 ML/MIN/1.73
GFR NON-AFRICAN AMERICAN: 31 ML/MIN/1.73
GLUCOSE BLD-MCNC: 110 MG/DL (ref 74–109)
GLUCOSE BLD-MCNC: 94 MG/DL (ref 74–109)
HCT VFR BLD CALC: 33.9 % (ref 34–48)
HEMOGLOBIN: 11.7 G/DL (ref 11.5–15.5)
IMMATURE GRANULOCYTES #: 0.05 E9/L
IMMATURE GRANULOCYTES %: 0.7 % (ref 0–5)
LYMPHOCYTES ABSOLUTE: 0.66 E9/L (ref 1.5–4)
LYMPHOCYTES RELATIVE PERCENT: 9.7 % (ref 20–42)
MAGNESIUM: 2.8 MG/DL (ref 1.6–2.6)
MCH RBC QN AUTO: 35.3 PG (ref 26–35)
MCHC RBC AUTO-ENTMCNC: 34.5 % (ref 32–34.5)
MCV RBC AUTO: 102.4 FL (ref 80–99.9)
MONOCYTES ABSOLUTE: 0.91 E9/L (ref 0.1–0.95)
MONOCYTES RELATIVE PERCENT: 13.3 % (ref 2–12)
NEUTROPHILS ABSOLUTE: 5.02 E9/L (ref 1.8–7.3)
NEUTROPHILS RELATIVE PERCENT: 73.5 % (ref 43–80)
PDW BLD-RTO: 17 FL (ref 11.5–15)
PHOSPHORUS: 4 MG/DL (ref 2.5–4.5)
PLATELET # BLD: 142 E9/L (ref 130–450)
PMV BLD AUTO: 11.7 FL (ref 7–12)
POTASSIUM SERPL-SCNC: 4.4 MMOL/L (ref 3.5–5)
POTASSIUM SERPL-SCNC: 5.4 MMOL/L (ref 3.5–5)
RBC # BLD: 3.31 E12/L (ref 3.5–5.5)
SODIUM BLD-SCNC: 137 MMOL/L (ref 132–146)
SODIUM BLD-SCNC: 138 MMOL/L (ref 132–146)
TOTAL PROTEIN: 5.9 G/DL (ref 6.4–8.3)
WBC # BLD: 6.8 E9/L (ref 4.5–11.5)

## 2018-08-01 PROCEDURE — 6370000000 HC RX 637 (ALT 250 FOR IP): Performed by: INTERNAL MEDICINE

## 2018-08-01 PROCEDURE — 6360000002 HC RX W HCPCS: Performed by: INTERNAL MEDICINE

## 2018-08-01 PROCEDURE — 80053 COMPREHEN METABOLIC PANEL: CPT

## 2018-08-01 PROCEDURE — 84100 ASSAY OF PHOSPHORUS: CPT

## 2018-08-01 PROCEDURE — 6370000000 HC RX 637 (ALT 250 FOR IP): Performed by: STUDENT IN AN ORGANIZED HEALTH CARE EDUCATION/TRAINING PROGRAM

## 2018-08-01 PROCEDURE — 99232 SBSQ HOSP IP/OBS MODERATE 35: CPT | Performed by: INTERNAL MEDICINE

## 2018-08-01 PROCEDURE — 36415 COLL VENOUS BLD VENIPUNCTURE: CPT

## 2018-08-01 PROCEDURE — 83735 ASSAY OF MAGNESIUM: CPT

## 2018-08-01 PROCEDURE — 2700000000 HC OXYGEN THERAPY PER DAY

## 2018-08-01 PROCEDURE — 85025 COMPLETE CBC W/AUTO DIFF WBC: CPT

## 2018-08-01 PROCEDURE — 2580000003 HC RX 258: Performed by: INTERNAL MEDICINE

## 2018-08-01 PROCEDURE — 97530 THERAPEUTIC ACTIVITIES: CPT

## 2018-08-01 PROCEDURE — 80048 BASIC METABOLIC PNL TOTAL CA: CPT

## 2018-08-01 PROCEDURE — 2580000003 HC RX 258: Performed by: STUDENT IN AN ORGANIZED HEALTH CARE EDUCATION/TRAINING PROGRAM

## 2018-08-01 PROCEDURE — 94640 AIRWAY INHALATION TREATMENT: CPT

## 2018-08-01 RX ORDER — CARVEDILOL 12.5 MG/1
12.5 TABLET ORAL 2 TIMES DAILY WITH MEALS
Qty: 60 TABLET | Refills: 3 | Status: SHIPPED | OUTPATIENT
Start: 2018-08-01

## 2018-08-01 RX ORDER — PANTOPRAZOLE SODIUM 40 MG/1
40 TABLET, DELAYED RELEASE ORAL
Qty: 30 TABLET | Refills: 3 | Status: SHIPPED | OUTPATIENT
Start: 2018-08-02

## 2018-08-01 RX ORDER — FUROSEMIDE 10 MG/ML
40 INJECTION INTRAMUSCULAR; INTRAVENOUS ONCE
Status: COMPLETED | OUTPATIENT
Start: 2018-08-01 | End: 2018-08-01

## 2018-08-01 RX ADMIN — PANTOPRAZOLE SODIUM 40 MG: 40 TABLET, DELAYED RELEASE ORAL at 05:58

## 2018-08-01 RX ADMIN — Medication 10 ML: at 08:25

## 2018-08-01 RX ADMIN — Medication 0.5 MG: at 09:36

## 2018-08-01 RX ADMIN — MAGNESIUM HYDROXIDE 30 ML: 400 SUSPENSION ORAL at 15:39

## 2018-08-01 RX ADMIN — ASPIRIN 81 MG CHEWABLE TABLET 81 MG: 81 TABLET CHEWABLE at 08:25

## 2018-08-01 RX ADMIN — FUROSEMIDE 40 MG: 10 INJECTION, SOLUTION INTRAVENOUS at 15:29

## 2018-08-01 RX ADMIN — PIPERACILLIN SODIUM, TAZOBACTAM SODIUM 3.38 G: 3; .375 INJECTION, POWDER, LYOPHILIZED, FOR SOLUTION INTRAVENOUS at 08:25

## 2018-08-01 RX ADMIN — DIGOXIN 125 MCG: 125 TABLET ORAL at 08:25

## 2018-08-01 RX ADMIN — CARVEDILOL 12.5 MG: 6.25 TABLET, FILM COATED ORAL at 08:25

## 2018-08-01 ASSESSMENT — PAIN SCALES - GENERAL
PAINLEVEL_OUTOF10: 0

## 2018-08-01 NOTE — PROGRESS NOTES
hair at sink, pt unable to complete task d/t fatigue and impaired balance with BUE integration. During task without UE support on sink or [employer] pt required Mod A dyn balance and cues for upright posture/adapted ADL technique Min A    Upper Body Dressing Mod A   Mod A   Lower Body Dressing Max A  Max A   Bathing Max A  Max A   Toileting  Max A  catheter   Bed Mobility  Supine to Sit: Mod A  Sit to Supine: Mod A     supine to sit EOB: Min A  Education on body mechanics and pain management   Functional Transfers Min A sit<>stand  Max cues for proper hand placement, sequencing and safety technique; fair- safety during transfers    Min A  W/ ww  Thorough education/min cues for safety awareness. Also reinforced management of ww   Functional Mobility Min/Mod A with WW short distances in room  As pt fatigued requird Mod A and max cues for safe mgmt of [employer]; fair- safety   Min A  W/ ww  Limited distance d/t c/o BLE pain. Reinforced pain management strategies and safety awareness.       Sit balance: SBA - Facilitated light reaching activity when sitting unsupported. Education on body mechanics and safety. Stand balance: Min A w/ ww. Facilitated education on posture and hand placement when standing. Endurance/Activity tolerance: Fair-; limited by weakness and BLE pain       Comments: Upon arrival pt lying in bed. At end of session seated in chair all lines and tubes intact, call light within reach. · Pt has made fair progress towards set goals.    · Continue with current plan of care    Time in: 14:24  Time out:14:34  Total Tx Time: 10 minutes    Daya Gómez, OTR/L #[id number]

## 2018-08-01 NOTE — PROGRESS NOTES
77 18 98 % -   08/01/18 0311 (!) 99/54 97.5 °F (36.4 °C) Temporal 75 16 96 % -   07/31/18 2336 101/64 97.9 °F (36.6 °C) Temporal 73 16 96 % -   07/31/18 2030 94/62 97.6 °F (36.4 °C) Oral 79 18 98 % -   07/31/18 1559 105/60 96.8 °F (36 °C) Axillary 66 18 99 % -   @      Intake/Output Summary (Last 24 hours) at 08/01/18 1417  Last data filed at 08/01/18 1308   Gross per 24 hour   Intake              930 ml   Output             1640 ml   Net             -710 ml         Wt Readings from Last 3 Encounters:   08/01/18 147 lb 0.8 oz (66.7 kg)   07/24/18 154 lb 11.2 oz (70.2 kg)   05/10/18 135 lb (61.2 kg)       Constitutional:  in no acute distress  Oral: mucus membranes moist  Neck: no JVD  Cardiovascular: S1, S2 regular rhythm, no murmur,or rub  Respiratory:  bilateral basilar crackles, no wheeze  Gastrointestinal:  Soft, nontender, nondistended, NABS  Ext: 2+ pitting lower extremity and dependent  edema, feet warm  Skin: dry, no rash  Neuro: awake, alert, interactive      DATA:    Recent Labs      07/30/18   0630 07/31/18 0422  08/01/18   0435   WBC  6.1  7.4  6.8   HGB  12.7  12.4  11.7   HCT  37.1  35.9  33.9*   MCV  103.1*  101.7*  102.4*   PLT  143  146  142     Recent Labs      07/30/18   0630   07/31/18   0422  08/01/18   0435  08/01/18   0842   NA  135   < >  136  138  137   K  5.1*   < >  4.8  5.4*  4.4   CL  95*   < >  97*  100  98   CO2  22   < >  24  24  25   MG  2.8*   --   2.7*  2.8*   --    PHOS   --    --    --   4.0   --    BUN  120*   < >  109*  99*  92*   CREATININE  1.9*   < >  1.9*  1.6*  1.7*   ALT  29   --   32  28   --    AST  47*   --   42*  57*   --    BILITOT  1.0   --   1.0  0.9   --    ALKPHOS  150*   --   168*  146*   --     < > = values in this interval not displayed. No results found for: LABPROT    ASSESSMENT / RECOMMENDATIONS:    1. Acute-on-chronic kidney disease, baseline creatinine 1.3 - 1.4 mg/dL   likely in context of diuresis.     2.  Acute-on-chronic congestive heart

## 2018-08-01 NOTE — PROGRESS NOTES
Subjective: The patient is awake and alert. Status post thoracentesis (7/29/2018). Denies chest pain, angina, and dyspnea. Denies abdominal pain. Tolerating diet. No nausea or vomiting. Objective:    /65   Pulse 77   Temp 97.8 °F (36.6 °C) (Temporal)   Resp 18   Ht 5' 7\" (1.702 m)   Wt 147 lb 0.8 oz (66.7 kg)   SpO2 98%   BMI 23.03 kg/m²     Current medications that patient is taking have been reviewed. Heart:  RRR, no murmurs, gallops, or rubs.   Lungs:  CTA bilaterally, no wheeze, rales or rhonchi  Abd: bowel sounds present, nontender, nondistended, no masses  Extrem:  1+ lower extremity edema    CBC with Differential:    Lab Results   Component Value Date    WBC 6.8 08/01/2018    RBC 3.31 08/01/2018    HGB 11.7 08/01/2018    HCT 33.9 08/01/2018     08/01/2018    .4 08/01/2018    MCH 35.3 08/01/2018    MCHC 34.5 08/01/2018    RDW 17.0 08/01/2018    LYMPHOPCT 9.7 08/01/2018    MONOPCT 13.3 08/01/2018    BASOPCT 0.3 08/01/2018    MONOSABS 0.91 08/01/2018    LYMPHSABS 0.66 08/01/2018    EOSABS 0.17 08/01/2018    BASOSABS 0.02 08/01/2018     CMP:    Lab Results   Component Value Date     08/01/2018    K 4.4 08/01/2018    K 4.8 07/31/2018    CL 98 08/01/2018    CO2 25 08/01/2018    BUN 92 08/01/2018    CREATININE 1.7 08/01/2018    GFRAA 35 08/01/2018    LABGLOM 29 08/01/2018    GLUCOSE 110 08/01/2018    PROT 5.9 08/01/2018    LABALBU 3.1 08/01/2018    CALCIUM 8.1 08/01/2018    BILITOT 0.9 08/01/2018    ALKPHOS 146 08/01/2018    AST 57 08/01/2018    ALT 28 08/01/2018     BMP:    Lab Results   Component Value Date     08/01/2018    K 4.4 08/01/2018    K 4.8 07/31/2018    CL 98 08/01/2018    CO2 25 08/01/2018    BUN 92 08/01/2018    LABALBU 3.1 08/01/2018    CREATININE 1.7 08/01/2018    CALCIUM 8.1 08/01/2018    GFRAA 35 08/01/2018    LABGLOM 29 08/01/2018    GLUCOSE 110 08/01/2018     Magnesium:    Lab Results   Component Value Date    MG 2.8 08/01/2018     Phosphorus:

## 2018-08-02 LAB
BLOOD CULTURE, ROUTINE: NORMAL
CULTURE, BLOOD 2: NORMAL

## 2018-08-02 NOTE — PLAN OF CARE
8/1/18- charting for 1300-  Pt with HFrEF. I provided the pt and her boyfriend with the Heart Failure book, the HF Zones, and the Sodium Content pamphlet. We reviewed the HF zones, signs and symptoms to report on day 1 of onset, medication compliance, daily weights, and low sodium diet. She has never smoked. The patient's contributing risk factors for heart failure are identified as HTN. I advised patient you can reduce the risk for heart failure exacerbations by modifying/controlling the risk factors they have. Pt informed to take medications as prescribed, follow a cardiac heart healthy / low sodium diet, weigh herself daily and exercise regularly- per her doctor's recommendation. I stressed the importance of calling her doctor on day 1 of onset of s/s in the \"Yellow Zone\" of the Heart Failure Zones. (when she returns home) And while she is at [facility] to inform her nurse. She verbalized understanding.     Echocardiogram / EF: 7/29/18- echo- LVEF 20%, global hypokinesis, moderately dilated LV, moderately dilated RV, moderately reduced RVSF, LA mod dilated, moderately enlarged RA, mod-severe MR, mod-severe TR, PH mild-mod, LVDD: 8.3, RVDD: 4.6    History of:  MS, AFib, HTN, Gout, Hyperlipidemia, CAD, CHF, Bi- V ICD, CHASTITY, CKD    Medications: Coreg, Demadex, Lanoxin, Coenzyme Q10    Code Status: prior    The patient is ordered:  Diet (sodium restriction):  Sodium/fluid restriction daily ordered (fluid restriction recommended if patient is hyponatremic and/or diuretic is initiated or increased):  [] Yes     [] No  FR:  Daily Weights:ordered  I/O: ordered    I provided the patient with the following:  · The Heart Failure Book, \"Caring for Your Heart: Living Well with Heart Failure\"  · The Heart Failure Zones  · Sodium Content of Foods  · Sodium-Free Flavoring Tips  · Low-Sodium Nutrition Therapy       The Heart Failure Booklet was given to the patient, which addresses:  · Signs and symptoms of HF to report   · Home

## 2018-08-03 LAB
ORGANISM: ABNORMAL
URINE CULTURE, ROUTINE: ABNORMAL
URINE CULTURE, ROUTINE: ABNORMAL

## 2018-08-07 LAB
EKG ATRIAL RATE: 42 BPM
EKG Q-T INTERVAL: 422 MS
EKG QRS DURATION: 178 MS
EKG QTC CALCULATION (BAZETT): 541 MS
EKG R AXIS: -54 DEGREES
EKG T AXIS: 106 DEGREES
EKG VENTRICULAR RATE: 99 BPM

## 2018-09-03 LAB
FUNGUS (MYCOLOGY) CULTURE: NORMAL
FUNGUS STAIN: NORMAL

## 2018-09-10 ENCOUNTER — CLINICAL DOCUMENTATION (OUTPATIENT)
Dept: FAMILY MEDICINE CLINIC | Age: 78
End: 2018-09-10

## 2021-11-11 NOTE — PLAN OF CARE
Problem: Falls - Risk of:  Goal: Will remain free from falls  Will remain free from falls   Outcome: Met This Shift    Goal: Absence of physical injury  Absence of physical injury   Outcome: Met This Shift      Problem: Risk for Impaired Skin Integrity  Goal: Tissue integrity - skin and mucous membranes  Structural intactness and normal physiological function of skin and  mucous membranes.    Outcome: Met This Shift      Problem: Physical Regulation:  Goal: Ability to maintain clinical measurements within normal limits will improve  Ability to maintain clinical measurements within normal limits will improve  Outcome: Met This Shift      Problem: Safety:  Goal: Ability to remain free from injury will improve  Ability to remain free from injury will improve  Outcome: Met This Shift Crescentic Advancement Flap Text: The defect edges were debeveled with a #15 scalpel blade.  Given the location of the defect and the proximity to free margins a crescentic advancement flap was deemed most appropriate.  Using a sterile surgical marker, the appropriate advancement flap was drawn incorporating the defect and placing the expected incisions within the relaxed skin tension lines where possible.    The area thus outlined was incised deep to adipose tissue with a #15 scalpel blade.  The skin margins were undermined to an appropriate distance in all directions utilizing iris scissors.

## (undated) DEVICE — SYRINGE IRRIG 60ML SFT PLIABLE BLB EZ TO GRP 1 HND USE W/

## (undated) DEVICE — STERILE HOOK LOCK LATEX FREE ELASTIC BANDAGE 4INX5YD: Brand: HOOK LOCK™

## (undated) DEVICE — VESSEL LOOPS,MAXI, RED: Brand: DEVON

## (undated) DEVICE — 4-PORT MANIFOLD: Brand: NEPTUNE 2

## (undated) DEVICE — BANDAGE COMPR W6INXL12FT SMOOTH FOR LIMB EXSANG ESMARCH

## (undated) DEVICE — BANDAGE COBAN 4 IN COMPR W4INXL5YD FOAM COHESIVE QUIK STK SELF ADH SFT

## (undated) DEVICE — DRAPE SURGICAL HAND PROX AURORA

## (undated) DEVICE — GAUZE,SPONGE,4"X4",8PLY,STRL,LF,10/TRAY: Brand: MEDLINE

## (undated) DEVICE — STOCKINETTE,SINGLE PLY,4X48,STERILE: Brand: MEDLINE

## (undated) DEVICE — CONTROL SYRINGE LUER-LOCK TIP: Brand: MONOJECT

## (undated) DEVICE — DRESSING,GAUZE,XEROFORM,CURAD,1"X8",ST: Brand: CURAD

## (undated) DEVICE — PAD,ABDOMINAL,5"X9",ST,LF,25/BX: Brand: MEDLINE INDUSTRIES, INC.

## (undated) DEVICE — READY WET SKIN SCRUB TRAY-LF: Brand: MEDLINE INDUSTRIES, INC.

## (undated) DEVICE — ELECTRODE PT RET AD L9FT HI MOIST COND ADH HYDRGEL CORDED

## (undated) DEVICE — Device

## (undated) DEVICE — INTENDED FOR TISSUE SEPARATION, AND OTHER PROCEDURES THAT REQUIRE A SHARP SURGICAL BLADE TO PUNCTURE OR CUT.: Brand: BARD-PARKER ® STAINLESS STEEL BLADES

## (undated) DEVICE — BIT DRL L5IN DIA2MM STD ST S STL TWST BUSA

## (undated) DEVICE — RETRACTOR SENN

## (undated) DEVICE — STANDARD HYPODERMIC NEEDLE,POLYPROPYLENE HUB: Brand: MONOJECT

## (undated) DEVICE — SYRINGE MED 20ML STD CLR PLAS LUERLOCK TIP N CTRL DISP

## (undated) DEVICE — NEEDLE FLTR 18GA L1.5IN MEM THK5UM BLNT DISP

## (undated) DEVICE — DOUBLE BASIN SET: Brand: MEDLINE INDUSTRIES, INC.

## (undated) DEVICE — ZIMMER® STERILE DISPOSABLE TOURNIQUET CUFF WITH PROTECTIVE SLEEVE AND PLC, DUAL PORT, SINGLE BLADDER, 18 IN. (46 CM)

## (undated) DEVICE — DRAPE,REIN 53X77,STERILE: Brand: MEDLINE

## (undated) DEVICE — PACK PROCEDURE SURG GEN CUST

## (undated) DEVICE — BANDAGE,GAUZE,BULKEE II,4.5"X4.1YD,STRL: Brand: MEDLINE

## (undated) DEVICE — SOLUTION IV IRRIG POUR BRL 0.9% SODIUM CHL 2F7124

## (undated) DEVICE — COVER HNDL LT DISP

## (undated) DEVICE — 1 ML TUBERCULIN SYRINGE,DETACHABLE NEEDLE: Brand: MONOJECT

## (undated) DEVICE — DRAPE C ARM W41XL74IN UNIV MOB W RUBBERBAND CLP

## (undated) DEVICE — HEWSON SUTURE RETRIEVER: Brand: HEWSON SUTURE RETRIEVER

## (undated) DEVICE — PADDING,UNDERCAST,COTTON, 4"X4YD STERILE: Brand: MEDLINE

## (undated) DEVICE — GOWN,SIRUS,FABRNF,L,20/CS: Brand: MEDLINE

## (undated) DEVICE — 3M™ STERI-DRAPE™ U-DRAPE 1015: Brand: STERI-DRAPE™

## (undated) DEVICE — TUBING SUCT 12FR MAL ALUM SHFT FN CAP VENT UNIV CONN W/ OBT

## (undated) DEVICE — TOWEL,OR,DSP,ST,BLUE,STD,6/PK,12PK/CS: Brand: MEDLINE

## (undated) DEVICE — GOWN,SIRUS,FABRNF,XL,20/CS: Brand: MEDLINE